# Patient Record
Sex: MALE | Race: WHITE | URBAN - METROPOLITAN AREA
[De-identification: names, ages, dates, MRNs, and addresses within clinical notes are randomized per-mention and may not be internally consistent; named-entity substitution may affect disease eponyms.]

---

## 2018-01-01 ENCOUNTER — APPOINTMENT (OUTPATIENT)
Dept: OCCUPATIONAL THERAPY | Facility: CLINIC | Age: 53
DRG: 247 | End: 2018-01-01
Attending: INTERNAL MEDICINE
Payer: COMMERCIAL

## 2018-01-01 ENCOUNTER — APPOINTMENT (OUTPATIENT)
Dept: OCCUPATIONAL THERAPY | Facility: CLINIC | Age: 53
DRG: 247 | End: 2018-01-01
Payer: COMMERCIAL

## 2018-01-01 ENCOUNTER — APPOINTMENT (OUTPATIENT)
Dept: GENERAL RADIOLOGY | Facility: CLINIC | Age: 53
DRG: 247 | End: 2018-01-01
Attending: EMERGENCY MEDICINE
Payer: COMMERCIAL

## 2018-01-01 ENCOUNTER — TELEPHONE (OUTPATIENT)
Dept: CARDIOLOGY | Facility: CLINIC | Age: 53
End: 2018-01-01

## 2018-01-01 ENCOUNTER — HOSPITAL ENCOUNTER (INPATIENT)
Facility: CLINIC | Age: 53
LOS: 3 days | Discharge: HOME | DRG: 247 | End: 2018-05-15
Attending: EMERGENCY MEDICINE | Admitting: INTERNAL MEDICINE
Payer: COMMERCIAL

## 2018-01-01 ENCOUNTER — CARE COORDINATION (OUTPATIENT)
Dept: CARDIOLOGY | Facility: CLINIC | Age: 53
End: 2018-01-01

## 2018-01-01 ENCOUNTER — TELEPHONE (OUTPATIENT)
Dept: PHARMACY | Facility: OTHER | Age: 53
End: 2018-01-01

## 2018-01-01 ENCOUNTER — APPOINTMENT (OUTPATIENT)
Dept: CARDIOLOGY | Facility: CLINIC | Age: 53
DRG: 247 | End: 2018-01-01
Attending: INTERNAL MEDICINE
Payer: COMMERCIAL

## 2018-01-01 ENCOUNTER — APPOINTMENT (OUTPATIENT)
Dept: CARDIOLOGY | Facility: CLINIC | Age: 53
DRG: 247 | End: 2018-01-01
Attending: EMERGENCY MEDICINE
Payer: COMMERCIAL

## 2018-01-01 VITALS
WEIGHT: 234 LBS | HEART RATE: 98 BPM | SYSTOLIC BLOOD PRESSURE: 94 MMHG | BODY MASS INDEX: 34.66 KG/M2 | DIASTOLIC BLOOD PRESSURE: 64 MMHG | RESPIRATION RATE: 16 BRPM | HEIGHT: 69 IN | TEMPERATURE: 98.7 F | OXYGEN SATURATION: 97 %

## 2018-01-01 DIAGNOSIS — E11.9 TYPE 2 DIABETES MELLITUS WITHOUT COMPLICATION, WITHOUT LONG-TERM CURRENT USE OF INSULIN (H): Primary | ICD-10-CM

## 2018-01-01 DIAGNOSIS — E78.5 HYPERLIPIDEMIA LDL GOAL <70: ICD-10-CM

## 2018-01-01 DIAGNOSIS — I22.0: ICD-10-CM

## 2018-01-01 LAB
ANION GAP SERPL CALCULATED.3IONS-SCNC: 11 MMOL/L (ref 3–14)
ANION GAP SERPL CALCULATED.3IONS-SCNC: 9 MMOL/L (ref 3–14)
APTT PPP: 24 SEC (ref 22–37)
BASOPHILS # BLD AUTO: 0.1 10E9/L (ref 0–0.2)
BASOPHILS NFR BLD AUTO: 0.3 %
BUN SERPL-MCNC: 12 MG/DL (ref 7–30)
BUN SERPL-MCNC: 13 MG/DL (ref 7–30)
CALCIUM SERPL-MCNC: 8.4 MG/DL (ref 8.5–10.1)
CALCIUM SERPL-MCNC: 9.3 MG/DL (ref 8.5–10.1)
CHLORIDE SERPL-SCNC: 105 MMOL/L (ref 94–109)
CHLORIDE SERPL-SCNC: 107 MMOL/L (ref 94–109)
CHOLEST SERPL-MCNC: 323 MG/DL
CO2 SERPL-SCNC: 20 MMOL/L (ref 20–32)
CO2 SERPL-SCNC: 23 MMOL/L (ref 20–32)
CREAT SERPL-MCNC: 0.93 MG/DL (ref 0.66–1.25)
CREAT SERPL-MCNC: 0.97 MG/DL (ref 0.66–1.25)
DIFFERENTIAL METHOD BLD: ABNORMAL
EOSINOPHIL # BLD AUTO: 0.5 10E9/L (ref 0–0.7)
EOSINOPHIL NFR BLD AUTO: 2.4 %
ERYTHROCYTE [DISTWIDTH] IN BLOOD BY AUTOMATED COUNT: 14.4 % (ref 10–15)
ERYTHROCYTE [DISTWIDTH] IN BLOOD BY AUTOMATED COUNT: 14.5 % (ref 10–15)
GFR SERPL CREATININE-BSD FRML MDRD: 81 ML/MIN/1.7M2
GFR SERPL CREATININE-BSD FRML MDRD: 85 ML/MIN/1.7M2
GLUCOSE BLDC GLUCOMTR-MCNC: 156 MG/DL (ref 70–99)
GLUCOSE BLDC GLUCOMTR-MCNC: 176 MG/DL (ref 70–99)
GLUCOSE BLDC GLUCOMTR-MCNC: 176 MG/DL (ref 70–99)
GLUCOSE BLDC GLUCOMTR-MCNC: 180 MG/DL (ref 70–99)
GLUCOSE BLDC GLUCOMTR-MCNC: 182 MG/DL (ref 70–99)
GLUCOSE BLDC GLUCOMTR-MCNC: 184 MG/DL (ref 70–99)
GLUCOSE BLDC GLUCOMTR-MCNC: 226 MG/DL (ref 70–99)
GLUCOSE SERPL-MCNC: 174 MG/DL (ref 70–99)
GLUCOSE SERPL-MCNC: 195 MG/DL (ref 70–99)
HBA1C MFR BLD: 7.1 % (ref 0–5.6)
HCT VFR BLD AUTO: 40.5 % (ref 40–53)
HCT VFR BLD AUTO: 44.7 % (ref 40–53)
HDLC SERPL-MCNC: 47 MG/DL
HGB BLD-MCNC: 14.4 G/DL (ref 13.3–17.7)
HGB BLD-MCNC: 16 G/DL (ref 13.3–17.7)
IMM GRANULOCYTES # BLD: 0.1 10E9/L (ref 0–0.4)
IMM GRANULOCYTES NFR BLD: 0.6 %
INR PPP: 0.95 (ref 0.86–1.14)
INTERPRETATION ECG - MUSE: NORMAL
INTERPRETATION ECG - MUSE: NORMAL
KCT BLD-ACNC: 607 SEC (ref 75–150)
LACTATE BLD-SCNC: 1.3 MMOL/L (ref 0.4–1.9)
LACTATE BLD-SCNC: 2.7 MMOL/L (ref 0.4–1.9)
LDLC SERPL CALC-MCNC: 231 MG/DL
LYMPHOCYTES # BLD AUTO: 6.7 10E9/L (ref 0.8–5.3)
LYMPHOCYTES NFR BLD AUTO: 35.4 %
MCH RBC QN AUTO: 32.2 PG (ref 26.5–33)
MCH RBC QN AUTO: 32.7 PG (ref 26.5–33)
MCHC RBC AUTO-ENTMCNC: 35.6 G/DL (ref 31.5–36.5)
MCHC RBC AUTO-ENTMCNC: 35.8 G/DL (ref 31.5–36.5)
MCV RBC AUTO: 91 FL (ref 78–100)
MCV RBC AUTO: 91 FL (ref 78–100)
MONOCYTES # BLD AUTO: 1.4 10E9/L (ref 0–1.3)
MONOCYTES NFR BLD AUTO: 7.5 %
NEUTROPHILS # BLD AUTO: 10.2 10E9/L (ref 1.6–8.3)
NEUTROPHILS NFR BLD AUTO: 53.8 %
NONHDLC SERPL-MCNC: 276 MG/DL
NRBC # BLD AUTO: 0 10*3/UL
NRBC BLD AUTO-RTO: 0 /100
PLATELET # BLD AUTO: 215 10E9/L (ref 150–450)
PLATELET # BLD AUTO: 240 10E9/L (ref 150–450)
PLATELET # BLD EST: ABNORMAL 10*3/UL
POTASSIUM SERPL-SCNC: 3.8 MMOL/L (ref 3.4–5.3)
POTASSIUM SERPL-SCNC: 3.9 MMOL/L (ref 3.4–5.3)
RBC # BLD AUTO: 4.47 10E12/L (ref 4.4–5.9)
RBC # BLD AUTO: 4.9 10E12/L (ref 4.4–5.9)
RBC MORPH BLD: NORMAL
SODIUM SERPL-SCNC: 137 MMOL/L (ref 133–144)
SODIUM SERPL-SCNC: 138 MMOL/L (ref 133–144)
TRIGL SERPL-MCNC: 225 MG/DL
TROPONIN I BLD-MCNC: 1.08 UG/L (ref 0–0.1)
TROPONIN I SERPL-MCNC: 1.54 UG/L (ref 0–0.04)
TROPONIN I SERPL-MCNC: 113.55 UG/L (ref 0–0.04)
TROPONIN I SERPL-MCNC: 148.99 UG/L (ref 0–0.04)
WBC # BLD AUTO: 18.4 10E9/L (ref 4–11)
WBC # BLD AUTO: 19 10E9/L (ref 4–11)

## 2018-01-01 PROCEDURE — 99232 SBSQ HOSP IP/OBS MODERATE 35: CPT | Performed by: INTERNAL MEDICINE

## 2018-01-01 PROCEDURE — 25000132 ZZH RX MED GY IP 250 OP 250 PS 637: Performed by: INTERNAL MEDICINE

## 2018-01-01 PROCEDURE — 99207 ZZC CDG-HISTORY COMP: MEETS EXP. PROBLEM FOCUSED - DOWN CODED LACK OF HPI: CPT | Performed by: INTERNAL MEDICINE

## 2018-01-01 PROCEDURE — 25500064 ZZH RX 255 OP 636: Performed by: INTERNAL MEDICINE

## 2018-01-01 PROCEDURE — 25000128 H RX IP 250 OP 636: Performed by: NURSE PRACTITIONER

## 2018-01-01 PROCEDURE — 83605 ASSAY OF LACTIC ACID: CPT | Performed by: INTERNAL MEDICINE

## 2018-01-01 PROCEDURE — 97110 THERAPEUTIC EXERCISES: CPT | Mod: GO | Performed by: OCCUPATIONAL THERAPIST

## 2018-01-01 PROCEDURE — 84484 ASSAY OF TROPONIN QUANT: CPT

## 2018-01-01 PROCEDURE — 99239 HOSP IP/OBS DSCHRG MGMT >30: CPT | Performed by: INTERNAL MEDICINE

## 2018-01-01 PROCEDURE — 25000128 H RX IP 250 OP 636: Performed by: EMERGENCY MEDICINE

## 2018-01-01 PROCEDURE — 80061 LIPID PANEL: CPT | Performed by: INTERNAL MEDICINE

## 2018-01-01 PROCEDURE — 71045 X-RAY EXAM CHEST 1 VIEW: CPT

## 2018-01-01 PROCEDURE — C9606 PERC D-E COR REVASC W AMI S: HCPCS | Mod: LD

## 2018-01-01 PROCEDURE — 027034Z DILATION OF CORONARY ARTERY, ONE ARTERY WITH DRUG-ELUTING INTRALUMINAL DEVICE, PERCUTANEOUS APPROACH: ICD-10-PCS | Performed by: INTERNAL MEDICINE

## 2018-01-01 PROCEDURE — 93010 ELECTROCARDIOGRAM REPORT: CPT | Performed by: INTERNAL MEDICINE

## 2018-01-01 PROCEDURE — 99152 MOD SED SAME PHYS/QHP 5/>YRS: CPT | Mod: GC | Performed by: INTERNAL MEDICINE

## 2018-01-01 PROCEDURE — 93458 L HRT ARTERY/VENTRICLE ANGIO: CPT | Mod: 26 | Performed by: INTERNAL MEDICINE

## 2018-01-01 PROCEDURE — 36415 COLL VENOUS BLD VENIPUNCTURE: CPT | Performed by: INTERNAL MEDICINE

## 2018-01-01 PROCEDURE — 25000128 H RX IP 250 OP 636: Performed by: INTERNAL MEDICINE

## 2018-01-01 PROCEDURE — 99207 ZZC CDG-MDM COMPONENT: MEETS LOW - DOWN CODED: CPT | Performed by: INTERNAL MEDICINE

## 2018-01-01 PROCEDURE — 25000132 ZZH RX MED GY IP 250 OP 250 PS 637: Performed by: EMERGENCY MEDICINE

## 2018-01-01 PROCEDURE — 84484 ASSAY OF TROPONIN QUANT: CPT | Performed by: INTERNAL MEDICINE

## 2018-01-01 PROCEDURE — 93005 ELECTROCARDIOGRAM TRACING: CPT

## 2018-01-01 PROCEDURE — 40000133 ZZH STATISTIC OT WARD VISIT: Performed by: OCCUPATIONAL THERAPIST

## 2018-01-01 PROCEDURE — 85347 COAGULATION TIME ACTIVATED: CPT

## 2018-01-01 PROCEDURE — C1887 CATHETER, GUIDING: HCPCS

## 2018-01-01 PROCEDURE — 80048 BASIC METABOLIC PNL TOTAL CA: CPT | Performed by: INTERNAL MEDICINE

## 2018-01-01 PROCEDURE — 97165 OT EVAL LOW COMPLEX 30 MIN: CPT | Mod: GO

## 2018-01-01 PROCEDURE — C1874 STENT, COATED/COV W/DEL SYS: HCPCS

## 2018-01-01 PROCEDURE — 25000125 ZZHC RX 250: Performed by: INTERNAL MEDICINE

## 2018-01-01 PROCEDURE — C1769 GUIDE WIRE: HCPCS

## 2018-01-01 PROCEDURE — 25000131 ZZH RX MED GY IP 250 OP 636 PS 637: Performed by: INTERNAL MEDICINE

## 2018-01-01 PROCEDURE — 85025 COMPLETE CBC W/AUTO DIFF WBC: CPT | Performed by: EMERGENCY MEDICINE

## 2018-01-01 PROCEDURE — 40000133 ZZH STATISTIC OT WARD VISIT

## 2018-01-01 PROCEDURE — 21000000 ZZH R&B IMCU HEART CARE

## 2018-01-01 PROCEDURE — 27210946 ZZH KIT HC TOTES DISP CR8

## 2018-01-01 PROCEDURE — 80048 BASIC METABOLIC PNL TOTAL CA: CPT | Performed by: EMERGENCY MEDICINE

## 2018-01-01 PROCEDURE — 99285 EMERGENCY DEPT VISIT HI MDM: CPT | Mod: 25

## 2018-01-01 PROCEDURE — 92941 PRQ TRLML REVSC TOT OCCL AMI: CPT | Mod: LD | Performed by: INTERNAL MEDICINE

## 2018-01-01 PROCEDURE — 99223 1ST HOSP IP/OBS HIGH 75: CPT | Mod: 25 | Performed by: INTERNAL MEDICINE

## 2018-01-01 PROCEDURE — 27210787 ZZH MANIFOLD CR2

## 2018-01-01 PROCEDURE — 93306 TTE W/DOPPLER COMPLETE: CPT | Mod: 26 | Performed by: INTERNAL MEDICINE

## 2018-01-01 PROCEDURE — 84484 ASSAY OF TROPONIN QUANT: CPT | Performed by: EMERGENCY MEDICINE

## 2018-01-01 PROCEDURE — 99406 BEHAV CHNG SMOKING 3-10 MIN: CPT | Performed by: OCCUPATIONAL THERAPIST

## 2018-01-01 PROCEDURE — B2111ZZ FLUOROSCOPY OF MULTIPLE CORONARY ARTERIES USING LOW OSMOLAR CONTRAST: ICD-10-PCS | Performed by: INTERNAL MEDICINE

## 2018-01-01 PROCEDURE — 85027 COMPLETE CBC AUTOMATED: CPT | Performed by: INTERNAL MEDICINE

## 2018-01-01 PROCEDURE — 83036 HEMOGLOBIN GLYCOSYLATED A1C: CPT | Performed by: INTERNAL MEDICINE

## 2018-01-01 PROCEDURE — 99153 MOD SED SAME PHYS/QHP EA: CPT

## 2018-01-01 PROCEDURE — 85730 THROMBOPLASTIN TIME PARTIAL: CPT | Performed by: EMERGENCY MEDICINE

## 2018-01-01 PROCEDURE — 93458 L HRT ARTERY/VENTRICLE ANGIO: CPT

## 2018-01-01 PROCEDURE — 4A023N7 MEASUREMENT OF CARDIAC SAMPLING AND PRESSURE, LEFT HEART, PERCUTANEOUS APPROACH: ICD-10-PCS | Performed by: INTERNAL MEDICINE

## 2018-01-01 PROCEDURE — 00000146 ZZHCL STATISTIC GLUCOSE BY METER IP

## 2018-01-01 PROCEDURE — C1725 CATH, TRANSLUMIN NON-LASER: HCPCS

## 2018-01-01 PROCEDURE — 99221 1ST HOSP IP/OBS SF/LOW 40: CPT | Mod: AI | Performed by: INTERNAL MEDICINE

## 2018-01-01 PROCEDURE — 99152 MOD SED SAME PHYS/QHP 5/>YRS: CPT

## 2018-01-01 PROCEDURE — 27210998 ZZH ACCESS HEART CATH CR6

## 2018-01-01 PROCEDURE — 97110 THERAPEUTIC EXERCISES: CPT | Mod: GO

## 2018-01-01 PROCEDURE — 85610 PROTHROMBIN TIME: CPT | Performed by: EMERGENCY MEDICINE

## 2018-01-01 PROCEDURE — 27210759 ZZH DEVICE INFLATION CR6

## 2018-01-01 RX ORDER — NICOTINE POLACRILEX 4 MG
15-30 LOZENGE BUCCAL
Status: DISCONTINUED | OUTPATIENT
Start: 2018-01-01 | End: 2018-01-01 | Stop reason: HOSPADM

## 2018-01-01 RX ORDER — NITROGLYCERIN 0.4 MG/1
0.4 TABLET SUBLINGUAL EVERY 5 MIN PRN
Status: DISCONTINUED | OUTPATIENT
Start: 2018-01-01 | End: 2018-01-01 | Stop reason: HOSPADM

## 2018-01-01 RX ORDER — PROTAMINE SULFATE 10 MG/ML
1-5 INJECTION, SOLUTION INTRAVENOUS
Status: DISCONTINUED | OUTPATIENT
Start: 2018-01-01 | End: 2018-01-01 | Stop reason: HOSPADM

## 2018-01-01 RX ORDER — LIDOCAINE HYDROCHLORIDE 10 MG/ML
30 INJECTION, SOLUTION EPIDURAL; INFILTRATION; INTRACAUDAL; PERINEURAL
Status: DISCONTINUED | OUTPATIENT
Start: 2018-01-01 | End: 2018-01-01 | Stop reason: HOSPADM

## 2018-01-01 RX ORDER — FLUMAZENIL 0.1 MG/ML
0.2 INJECTION, SOLUTION INTRAVENOUS
Status: DISCONTINUED | OUTPATIENT
Start: 2018-01-01 | End: 2018-01-01 | Stop reason: HOSPADM

## 2018-01-01 RX ORDER — NALOXONE HYDROCHLORIDE 0.4 MG/ML
.2-.4 INJECTION, SOLUTION INTRAMUSCULAR; INTRAVENOUS; SUBCUTANEOUS
Status: ACTIVE | OUTPATIENT
Start: 2018-01-01 | End: 2018-01-01

## 2018-01-01 RX ORDER — PRASUGREL 10 MG/1
10-60 TABLET, FILM COATED ORAL
Status: DISCONTINUED | OUTPATIENT
Start: 2018-01-01 | End: 2018-01-01 | Stop reason: HOSPADM

## 2018-01-01 RX ORDER — POTASSIUM CHLORIDE 29.8 MG/ML
20 INJECTION INTRAVENOUS
Status: DISCONTINUED | OUTPATIENT
Start: 2018-01-01 | End: 2018-01-01 | Stop reason: HOSPADM

## 2018-01-01 RX ORDER — DIPHENHYDRAMINE HYDROCHLORIDE 50 MG/ML
25-50 INJECTION INTRAMUSCULAR; INTRAVENOUS
Status: DISCONTINUED | OUTPATIENT
Start: 2018-01-01 | End: 2018-01-01 | Stop reason: HOSPADM

## 2018-01-01 RX ORDER — LIDOCAINE HYDROCHLORIDE 10 MG/ML
1-10 INJECTION, SOLUTION EPIDURAL; INFILTRATION; INTRACAUDAL; PERINEURAL
Status: COMPLETED | OUTPATIENT
Start: 2018-01-01 | End: 2018-01-01

## 2018-01-01 RX ORDER — METOPROLOL TARTRATE 1 MG/ML
5 INJECTION, SOLUTION INTRAVENOUS EVERY 5 MIN PRN
Status: DISCONTINUED | OUTPATIENT
Start: 2018-01-01 | End: 2018-01-01 | Stop reason: HOSPADM

## 2018-01-01 RX ORDER — ATROPINE SULFATE 0.1 MG/ML
.5-1 INJECTION INTRAVENOUS
Status: DISCONTINUED | OUTPATIENT
Start: 2018-01-01 | End: 2018-01-01 | Stop reason: HOSPADM

## 2018-01-01 RX ORDER — NALOXONE HYDROCHLORIDE 0.4 MG/ML
.1-.4 INJECTION, SOLUTION INTRAMUSCULAR; INTRAVENOUS; SUBCUTANEOUS
Status: DISCONTINUED | OUTPATIENT
Start: 2018-01-01 | End: 2018-01-01 | Stop reason: HOSPADM

## 2018-01-01 RX ORDER — VERAPAMIL HYDROCHLORIDE 2.5 MG/ML
1-2.5 INJECTION, SOLUTION INTRAVENOUS
Status: DISCONTINUED | OUTPATIENT
Start: 2018-01-01 | End: 2018-01-01 | Stop reason: HOSPADM

## 2018-01-01 RX ORDER — HEPARIN SODIUM 1000 [USP'U]/ML
1000-10000 INJECTION, SOLUTION INTRAVENOUS; SUBCUTANEOUS EVERY 5 MIN PRN
Status: DISCONTINUED | OUTPATIENT
Start: 2018-01-01 | End: 2018-01-01 | Stop reason: HOSPADM

## 2018-01-01 RX ORDER — GLUCOSAMINE HCL/CHONDROITIN SU 500-400 MG
CAPSULE ORAL
Qty: 100 EACH | Refills: 3 | Status: SHIPPED | OUTPATIENT
Start: 2018-01-01

## 2018-01-01 RX ORDER — HYDRALAZINE HYDROCHLORIDE 20 MG/ML
10-20 INJECTION INTRAMUSCULAR; INTRAVENOUS
Status: DISCONTINUED | OUTPATIENT
Start: 2018-01-01 | End: 2018-01-01 | Stop reason: HOSPADM

## 2018-01-01 RX ORDER — ENALAPRILAT 1.25 MG/ML
1.25-2.5 INJECTION INTRAVENOUS
Status: DISCONTINUED | OUTPATIENT
Start: 2018-01-01 | End: 2018-01-01 | Stop reason: HOSPADM

## 2018-01-01 RX ORDER — SODIUM NITROPRUSSIDE 25 MG/ML
100-200 INJECTION INTRAVENOUS
Status: DISCONTINUED | OUTPATIENT
Start: 2018-01-01 | End: 2018-01-01 | Stop reason: HOSPADM

## 2018-01-01 RX ORDER — CARVEDILOL 6.25 MG/1
6.25 TABLET ORAL 2 TIMES DAILY
Status: DISCONTINUED | OUTPATIENT
Start: 2018-01-01 | End: 2018-01-01 | Stop reason: HOSPADM

## 2018-01-01 RX ORDER — NITROGLYCERIN 20 MG/100ML
.07-1.84 INJECTION INTRAVENOUS CONTINUOUS PRN
Status: DISCONTINUED | OUTPATIENT
Start: 2018-01-01 | End: 2018-01-01 | Stop reason: HOSPADM

## 2018-01-01 RX ORDER — LISINOPRIL 2.5 MG/1
2.5 TABLET ORAL DAILY
Qty: 30 TABLET | Refills: 3 | Status: SHIPPED | OUTPATIENT
Start: 2018-01-01

## 2018-01-01 RX ORDER — ATORVASTATIN CALCIUM 40 MG/1
40 TABLET, FILM COATED ORAL DAILY
Status: DISCONTINUED | OUTPATIENT
Start: 2018-01-01 | End: 2018-01-01 | Stop reason: HOSPADM

## 2018-01-01 RX ORDER — ATROPINE SULFATE 0.1 MG/ML
0.5 INJECTION INTRAVENOUS EVERY 5 MIN PRN
Status: DISPENSED | OUTPATIENT
Start: 2018-01-01 | End: 2018-01-01

## 2018-01-01 RX ORDER — NITROGLYCERIN 0.4 MG/1
0.4 TABLET SUBLINGUAL EVERY 5 MIN PRN
Status: DISCONTINUED | OUTPATIENT
Start: 2018-01-01 | End: 2018-01-01

## 2018-01-01 RX ORDER — FLUMAZENIL 0.1 MG/ML
0.2 INJECTION, SOLUTION INTRAVENOUS
Status: ACTIVE | OUTPATIENT
Start: 2018-01-01 | End: 2018-01-01

## 2018-01-01 RX ORDER — NITROGLYCERIN 5 MG/ML
100-500 VIAL (ML) INTRAVENOUS
Status: DISCONTINUED | OUTPATIENT
Start: 2018-01-01 | End: 2018-01-01 | Stop reason: HOSPADM

## 2018-01-01 RX ORDER — DEXTROSE MONOHYDRATE 25 G/50ML
12.5-5 INJECTION, SOLUTION INTRAVENOUS EVERY 30 MIN PRN
Status: DISCONTINUED | OUTPATIENT
Start: 2018-01-01 | End: 2018-01-01 | Stop reason: HOSPADM

## 2018-01-01 RX ORDER — ASPIRIN 81 MG/1
81-324 TABLET, CHEWABLE ORAL
Status: DISCONTINUED | OUTPATIENT
Start: 2018-01-01 | End: 2018-01-01 | Stop reason: HOSPADM

## 2018-01-01 RX ORDER — ONDANSETRON 2 MG/ML
4 INJECTION INTRAMUSCULAR; INTRAVENOUS EVERY 4 HOURS PRN
Status: DISCONTINUED | OUTPATIENT
Start: 2018-01-01 | End: 2018-01-01 | Stop reason: HOSPADM

## 2018-01-01 RX ORDER — POTASSIUM CHLORIDE 7.45 MG/ML
10 INJECTION INTRAVENOUS
Status: DISCONTINUED | OUTPATIENT
Start: 2018-01-01 | End: 2018-01-01 | Stop reason: HOSPADM

## 2018-01-01 RX ORDER — FUROSEMIDE 10 MG/ML
20-100 INJECTION INTRAMUSCULAR; INTRAVENOUS
Status: COMPLETED | OUTPATIENT
Start: 2018-01-01 | End: 2018-01-01

## 2018-01-01 RX ORDER — ACETAMINOPHEN 325 MG/1
325-650 TABLET ORAL EVERY 4 HOURS PRN
Status: DISCONTINUED | OUTPATIENT
Start: 2018-01-01 | End: 2018-01-01 | Stop reason: HOSPADM

## 2018-01-01 RX ORDER — NITROGLYCERIN 5 MG/ML
100-200 VIAL (ML) INTRAVENOUS
Status: DISCONTINUED | OUTPATIENT
Start: 2018-01-01 | End: 2018-01-01 | Stop reason: HOSPADM

## 2018-01-01 RX ORDER — FUROSEMIDE 10 MG/ML
20 INJECTION INTRAMUSCULAR; INTRAVENOUS DAILY
Status: DISCONTINUED | OUTPATIENT
Start: 2018-01-01 | End: 2018-01-01

## 2018-01-01 RX ORDER — LORAZEPAM 2 MG/ML
.5-2 INJECTION INTRAMUSCULAR EVERY 4 HOURS PRN
Status: DISCONTINUED | OUTPATIENT
Start: 2018-01-01 | End: 2018-01-01 | Stop reason: HOSPADM

## 2018-01-01 RX ORDER — PHENYLEPHRINE HCL IN 0.9% NACL 1 MG/10 ML
20-100 SYRINGE (ML) INTRAVENOUS
Status: DISCONTINUED | OUTPATIENT
Start: 2018-01-01 | End: 2018-01-01 | Stop reason: HOSPADM

## 2018-01-01 RX ORDER — ASPIRIN 81 MG/1
81 TABLET ORAL DAILY
Status: DISCONTINUED | OUTPATIENT
Start: 2018-01-01 | End: 2018-01-01 | Stop reason: HOSPADM

## 2018-01-01 RX ORDER — PROMETHAZINE HYDROCHLORIDE 25 MG/ML
6.25-25 INJECTION, SOLUTION INTRAMUSCULAR; INTRAVENOUS EVERY 4 HOURS PRN
Status: DISCONTINUED | OUTPATIENT
Start: 2018-01-01 | End: 2018-01-01 | Stop reason: HOSPADM

## 2018-01-01 RX ORDER — LISINOPRIL 2.5 MG/1
2.5 TABLET ORAL AT BEDTIME
Status: DISCONTINUED | OUTPATIENT
Start: 2018-01-01 | End: 2018-01-01 | Stop reason: HOSPADM

## 2018-01-01 RX ORDER — FENTANYL CITRATE 50 UG/ML
25-50 INJECTION, SOLUTION INTRAMUSCULAR; INTRAVENOUS
Status: DISCONTINUED | OUTPATIENT
Start: 2018-01-01 | End: 2018-01-01 | Stop reason: HOSPADM

## 2018-01-01 RX ORDER — CARVEDILOL 6.25 MG/1
6.25 TABLET ORAL 2 TIMES DAILY
Qty: 60 TABLET | Refills: 3 | Status: SHIPPED | OUTPATIENT
Start: 2018-01-01

## 2018-01-01 RX ORDER — MORPHINE SULFATE 2 MG/ML
1-2 INJECTION, SOLUTION INTRAMUSCULAR; INTRAVENOUS EVERY 5 MIN PRN
Status: DISCONTINUED | OUTPATIENT
Start: 2018-01-01 | End: 2018-01-01 | Stop reason: HOSPADM

## 2018-01-01 RX ORDER — BUPIVACAINE HYDROCHLORIDE 2.5 MG/ML
1-10 INJECTION, SOLUTION EPIDURAL; INFILTRATION; INTRACAUDAL
Status: DISCONTINUED | OUTPATIENT
Start: 2018-01-01 | End: 2018-01-01 | Stop reason: HOSPADM

## 2018-01-01 RX ORDER — PROTAMINE SULFATE 10 MG/ML
25-100 INJECTION, SOLUTION INTRAVENOUS EVERY 5 MIN PRN
Status: DISCONTINUED | OUTPATIENT
Start: 2018-01-01 | End: 2018-01-01 | Stop reason: HOSPADM

## 2018-01-01 RX ORDER — NITROGLYCERIN 0.4 MG/1
TABLET SUBLINGUAL
Qty: 25 TABLET | Refills: 3 | Status: SHIPPED | OUTPATIENT
Start: 2018-01-01

## 2018-01-01 RX ORDER — CLOPIDOGREL 300 MG/1
300-600 TABLET, FILM COATED ORAL
Status: DISCONTINUED | OUTPATIENT
Start: 2018-01-01 | End: 2018-01-01 | Stop reason: HOSPADM

## 2018-01-01 RX ORDER — DEXTROSE MONOHYDRATE 25 G/50ML
25-50 INJECTION, SOLUTION INTRAVENOUS
Status: DISCONTINUED | OUTPATIENT
Start: 2018-01-01 | End: 2018-01-01 | Stop reason: HOSPADM

## 2018-01-01 RX ORDER — NIFEDIPINE 10 MG/1
10 CAPSULE ORAL
Status: DISCONTINUED | OUTPATIENT
Start: 2018-01-01 | End: 2018-01-01 | Stop reason: HOSPADM

## 2018-01-01 RX ORDER — CLOPIDOGREL BISULFATE 75 MG/1
75 TABLET ORAL
Status: DISCONTINUED | OUTPATIENT
Start: 2018-01-01 | End: 2018-01-01 | Stop reason: HOSPADM

## 2018-01-01 RX ORDER — ATORVASTATIN CALCIUM 40 MG/1
40 TABLET, FILM COATED ORAL DAILY
Qty: 30 TABLET | Refills: 3 | Status: SHIPPED | OUTPATIENT
Start: 2018-01-01

## 2018-01-01 RX ORDER — METHYLPREDNISOLONE SODIUM SUCCINATE 125 MG/2ML
125 INJECTION, POWDER, LYOPHILIZED, FOR SOLUTION INTRAMUSCULAR; INTRAVENOUS
Status: DISCONTINUED | OUTPATIENT
Start: 2018-01-01 | End: 2018-01-01 | Stop reason: HOSPADM

## 2018-01-01 RX ORDER — DOPAMINE HYDROCHLORIDE 160 MG/100ML
2-20 INJECTION, SOLUTION INTRAVENOUS CONTINUOUS PRN
Status: DISCONTINUED | OUTPATIENT
Start: 2018-01-01 | End: 2018-01-01 | Stop reason: HOSPADM

## 2018-01-01 RX ORDER — DOBUTAMINE HYDROCHLORIDE 200 MG/100ML
2-20 INJECTION INTRAVENOUS CONTINUOUS PRN
Status: DISCONTINUED | OUTPATIENT
Start: 2018-01-01 | End: 2018-01-01 | Stop reason: HOSPADM

## 2018-01-01 RX ORDER — HYDROCODONE BITARTRATE AND ACETAMINOPHEN 5; 325 MG/1; MG/1
1-2 TABLET ORAL EVERY 4 HOURS PRN
Status: DISCONTINUED | OUTPATIENT
Start: 2018-01-01 | End: 2018-01-01 | Stop reason: HOSPADM

## 2018-01-01 RX ORDER — IOPAMIDOL 755 MG/ML
160 INJECTION, SOLUTION INTRAVASCULAR ONCE
Status: DISCONTINUED | OUTPATIENT
Start: 2018-01-01 | End: 2018-01-01

## 2018-01-01 RX ORDER — CARVEDILOL 3.12 MG/1
3.12 TABLET ORAL 2 TIMES DAILY
Status: DISCONTINUED | OUTPATIENT
Start: 2018-01-01 | End: 2018-01-01 | Stop reason: DRUGHIGH

## 2018-01-01 RX ORDER — EPINEPHRINE 1 MG/ML
0.3 INJECTION, SOLUTION, CONCENTRATE INTRAVENOUS
Status: DISCONTINUED | OUTPATIENT
Start: 2018-01-01 | End: 2018-01-01 | Stop reason: HOSPADM

## 2018-01-01 RX ORDER — ASPIRIN 325 MG
325 TABLET ORAL
Status: DISCONTINUED | OUTPATIENT
Start: 2018-01-01 | End: 2018-01-01 | Stop reason: HOSPADM

## 2018-01-01 RX ORDER — FENTANYL CITRATE 50 UG/ML
25-50 INJECTION, SOLUTION INTRAMUSCULAR; INTRAVENOUS
Status: DISPENSED | OUTPATIENT
Start: 2018-01-01 | End: 2018-01-01

## 2018-01-01 RX ORDER — ADENOSINE 3 MG/ML
12-12000 INJECTION, SOLUTION INTRAVENOUS
Status: DISCONTINUED | OUTPATIENT
Start: 2018-01-01 | End: 2018-01-01 | Stop reason: HOSPADM

## 2018-01-01 RX ORDER — LANCETS
EACH MISCELLANEOUS
Qty: 100 EACH | Refills: 3 | Status: SHIPPED | OUTPATIENT
Start: 2018-01-01

## 2018-01-01 RX ORDER — NALOXONE HYDROCHLORIDE 0.4 MG/ML
0.4 INJECTION, SOLUTION INTRAMUSCULAR; INTRAVENOUS; SUBCUTANEOUS EVERY 5 MIN PRN
Status: DISCONTINUED | OUTPATIENT
Start: 2018-01-01 | End: 2018-01-01 | Stop reason: HOSPADM

## 2018-01-01 RX ORDER — ASPIRIN 81 MG/1
324 TABLET, CHEWABLE ORAL ONCE
Status: COMPLETED | OUTPATIENT
Start: 2018-01-01 | End: 2018-01-01

## 2018-01-01 RX ORDER — LISINOPRIL 10 MG/1
10 TABLET ORAL DAILY
Status: DISCONTINUED | OUTPATIENT
Start: 2018-01-01 | End: 2018-01-01

## 2018-01-01 RX ORDER — NICARDIPINE HYDROCHLORIDE 2.5 MG/ML
100 INJECTION INTRAVENOUS
Status: DISCONTINUED | OUTPATIENT
Start: 2018-01-01 | End: 2018-01-01 | Stop reason: HOSPADM

## 2018-01-01 RX ADMIN — ASPIRIN 81 MG: 81 TABLET, COATED ORAL at 09:05

## 2018-01-01 RX ADMIN — BIVALIRUDIN 1.75 MG/KG/HR: 250 INJECTION, POWDER, LYOPHILIZED, FOR SOLUTION INTRAVENOUS at 18:16

## 2018-01-01 RX ADMIN — CARVEDILOL 6.25 MG: 6.25 TABLET, FILM COATED ORAL at 20:33

## 2018-01-01 RX ADMIN — INSULIN ASPART 2 UNITS: 100 INJECTION, SOLUTION INTRAVENOUS; SUBCUTANEOUS at 19:58

## 2018-01-01 RX ADMIN — ATORVASTATIN CALCIUM 40 MG: 40 TABLET, FILM COATED ORAL at 08:55

## 2018-01-01 RX ADMIN — ACETAMINOPHEN 325 MG: 325 TABLET ORAL at 01:04

## 2018-01-01 RX ADMIN — Medication 81.7 MG: at 18:12

## 2018-01-01 RX ADMIN — SODIUM CHLORIDE 1000 ML: 9 INJECTION, SOLUTION INTRAVENOUS at 00:00

## 2018-01-01 RX ADMIN — LISINOPRIL 2.5 MG: 2.5 TABLET ORAL at 21:04

## 2018-01-01 RX ADMIN — CARVEDILOL 6.25 MG: 6.25 TABLET, FILM COATED ORAL at 08:55

## 2018-01-01 RX ADMIN — NITROGLYCERIN 0.4 MG: 0.4 TABLET SUBLINGUAL at 17:54

## 2018-01-01 RX ADMIN — CARVEDILOL 3.12 MG: 3.12 TABLET, FILM COATED ORAL at 20:31

## 2018-01-01 RX ADMIN — ACETAMINOPHEN 325 MG: 325 TABLET ORAL at 04:57

## 2018-01-01 RX ADMIN — FENTANYL CITRATE 200 MCG: 50 INJECTION, SOLUTION INTRAMUSCULAR; INTRAVENOUS at 18:41

## 2018-01-01 RX ADMIN — LISINOPRIL 10 MG: 10 TABLET ORAL at 20:31

## 2018-01-01 RX ADMIN — TICAGRELOR 90 MG: 90 TABLET ORAL at 20:31

## 2018-01-01 RX ADMIN — NITROGLYCERIN 0.4 MG: 0.4 TABLET SUBLINGUAL at 21:46

## 2018-01-01 RX ADMIN — TICAGRELOR 90 MG: 90 TABLET ORAL at 08:54

## 2018-01-01 RX ADMIN — TICAGRELOR 90 MG: 90 TABLET ORAL at 08:09

## 2018-01-01 RX ADMIN — FENTANYL CITRATE 12.5 MCG: 50 INJECTION INTRAMUSCULAR; INTRAVENOUS at 00:16

## 2018-01-01 RX ADMIN — MIDAZOLAM 2 MG: 1 INJECTION INTRAMUSCULAR; INTRAVENOUS at 18:41

## 2018-01-01 RX ADMIN — HEPARIN SODIUM 7500 UNITS: 1000 INJECTION, SOLUTION INTRAVENOUS; SUBCUTANEOUS at 17:54

## 2018-01-01 RX ADMIN — ASPIRIN 81 MG: 81 TABLET, COATED ORAL at 08:55

## 2018-01-01 RX ADMIN — INSULIN ASPART 1 UNITS: 100 INJECTION, SOLUTION INTRAVENOUS; SUBCUTANEOUS at 18:21

## 2018-01-01 RX ADMIN — TICAGRELOR 90 MG: 90 TABLET ORAL at 21:03

## 2018-01-01 RX ADMIN — ATORVASTATIN CALCIUM 40 MG: 40 TABLET, FILM COATED ORAL at 20:31

## 2018-01-01 RX ADMIN — FUROSEMIDE 20 MG: 10 INJECTION, SOLUTION INTRAVENOUS at 18:44

## 2018-01-01 RX ADMIN — NITROGLYCERIN 300 MCG: 5 INJECTION, SOLUTION INTRAVENOUS at 18:27

## 2018-01-01 RX ADMIN — TICAGRELOR 90 MG: 90 TABLET ORAL at 09:05

## 2018-01-01 RX ADMIN — ASPIRIN 81 MG: 81 TABLET, COATED ORAL at 08:09

## 2018-01-01 RX ADMIN — HYDROCODONE BITARTRATE AND ACETAMINOPHEN 1 TABLET: 5; 325 TABLET ORAL at 01:18

## 2018-01-01 RX ADMIN — ATORVASTATIN CALCIUM 40 MG: 40 TABLET, FILM COATED ORAL at 09:05

## 2018-01-01 RX ADMIN — TICAGRELOR 90 MG: 90 TABLET ORAL at 20:33

## 2018-01-01 RX ADMIN — HYDROCODONE BITARTRATE AND ACETAMINOPHEN 1 TABLET: 5; 325 TABLET ORAL at 01:04

## 2018-01-01 RX ADMIN — CARVEDILOL 6.25 MG: 6.25 TABLET, FILM COATED ORAL at 09:05

## 2018-01-01 RX ADMIN — TICAGRELOR 180 MG: 90 TABLET ORAL at 17:54

## 2018-01-01 RX ADMIN — HUMAN ALBUMIN MICROSPHERES AND PERFLUTREN 3 ML: 10; .22 INJECTION, SOLUTION INTRAVENOUS at 14:25

## 2018-01-01 RX ADMIN — LIDOCAINE HYDROCHLORIDE 8 ML: 10 INJECTION, SOLUTION EPIDURAL; INFILTRATION; INTRACAUDAL; PERINEURAL at 18:17

## 2018-01-01 RX ADMIN — HYDROCODONE BITARTRATE AND ACETAMINOPHEN 1 TABLET: 5; 325 TABLET ORAL at 21:05

## 2018-01-01 RX ADMIN — INSULIN ASPART 2 UNITS: 100 INJECTION, SOLUTION INTRAVENOUS; SUBCUTANEOUS at 14:11

## 2018-01-01 RX ADMIN — CARVEDILOL 6.25 MG: 6.25 TABLET, FILM COATED ORAL at 10:54

## 2018-01-01 RX ADMIN — INSULIN ASPART 2 UNITS: 100 INJECTION, SOLUTION INTRAVENOUS; SUBCUTANEOUS at 10:00

## 2018-01-01 RX ADMIN — ADENOSINE 120 MCG: 3 INJECTION, SOLUTION INTRAVENOUS at 18:31

## 2018-01-01 RX ADMIN — INSULIN ASPART 1 UNITS: 100 INJECTION, SOLUTION INTRAVENOUS; SUBCUTANEOUS at 08:53

## 2018-01-01 RX ADMIN — CARVEDILOL 6.25 MG: 6.25 TABLET, FILM COATED ORAL at 21:04

## 2018-01-01 RX ADMIN — ATORVASTATIN CALCIUM 40 MG: 40 TABLET, FILM COATED ORAL at 08:09

## 2018-01-01 RX ADMIN — ASPIRIN 81 MG 324 MG: 81 TABLET ORAL at 17:53

## 2018-01-01 RX ADMIN — HYDROCODONE BITARTRATE AND ACETAMINOPHEN 1 TABLET: 5; 325 TABLET ORAL at 04:57

## 2018-01-01 RX ADMIN — EPINEPHRINE 2 MCG: 0.1 INJECTION INTRACARDIAC; INTRAVENOUS at 00:23

## 2018-01-01 RX ADMIN — SODIUM CHLORIDE 300 ML: 9 INJECTION, SOLUTION INTRAVENOUS at 22:09

## 2018-01-01 ASSESSMENT — PAIN DESCRIPTION - DESCRIPTORS
DESCRIPTORS: ACHING;DISCOMFORT
DESCRIPTORS: SORE
DESCRIPTORS: ACHING;DISCOMFORT
DESCRIPTORS: DISCOMFORT
DESCRIPTORS: SORE
DESCRIPTORS: ACHING
DESCRIPTORS: SORE

## 2018-01-01 ASSESSMENT — ENCOUNTER SYMPTOMS
CHEST TIGHTNESS: 1
SHORTNESS OF BREATH: 1
FATIGUE: 1
NAUSEA: 0
DIAPHORESIS: 0

## 2018-05-12 PROBLEM — I21.3 STEMI (ST ELEVATION MYOCARDIAL INFARCTION) (H): Status: ACTIVE | Noted: 2018-01-01

## 2018-05-12 NOTE — H&P
"History and Physical  Dutch Donnelly    Age: 53 year old    YOB: 1965  MRN# [id number]    Primary care provider: No primary care provider on file.  Date of Admission:  5/12/2018    CHIEF COMPLAINT: STEMI    HISTORY OF PRESENT ILLNESS  Dutch Donnelly is a 53 year old male with no significant post medical history who presents with chest pain to ER. He had intermittent left chest pain described as \"tightness\" that radiates into and shoots down his left arm for the last ~2 days that has worsened and became consistent at 1600 today. He endorses fatigue today and also slept most of the day. Patient administered two ibuprofen two hours ago and believes that this somewhat alleviated his pain. He also had some shortness of breath. Patient denies diaphoresis, nausea, history of diabetes or hypertension, allergies to any medication, or other complaint.   In the ER, EKG showed ST elevation in the anterior leads. He was given heparin bolus and also brillanta.    ======================================================  ALLERGIES   No Known Allergies    MEDICATIONS  No prescriptions prior to admission.        PAST MEDICAL HISTORY  History reviewed. No pertinent past medical history.    PAST SURGICAL HISTORY  History reviewed. No pertinent surgical history.     SOCIAL HISTORY  No documented history of smoking  FAMILY HISTORY  No know family history of premature CAD  ===================================================  REVIEW OF SYSTEMS  Skin: negative  Eyes: negative  Ears/Nose/Throat: negative  Respiratory: as above  Cardiovascular: as above  Gastrointestinal: negative  Genitourinary: negative  Musculoskeletal: negative  Neurologic: negative  Psychiatric: negative  Hematologic/Lymphatic/Immunologic: negative  Endocrine: negative  ===================================================  PHYSICAL EXAM  BP (!) 147/99  Temp 97.3  F (36.3  C) (Oral)  Ht 1.753 m (5' 9\")  Wt 108.9 kg (240 lb)  SpO2 99%  BMI 35.44 kg/m2  240 " lbs 0 oz  Body mass index is 35.44 kg/(m^2).    Intake/Output Summary (Last 24 hours) at 05/12/18 1936  Last data filed at 05/12/18 1915   Gross per 24 hour   Intake                0 ml   Output              650 ml   Net             -650 ml     General: well-appearing, no acute distress  HEENT: NC/AT, oropharynx clear  Neck: supple, no LAD  Chest: CTA b/l  Heart: RRR, no m/r/g  Vascular: femoral and DP/PT pulses intact b/l  Abdomen: soft, NT/ND  Extremities: warm and well-perfused w/o cyanosis, clubbing or edema  Neuro: A&Ox3, non-focal  =====================================================  LABORATORY DATA  CMP  Recent Labs  Lab 05/12/18  1740      POTASSIUM 3.8   CHLORIDE 105   CO2 23   ANIONGAP 9   *   BUN 13   CR 0.97   GFRESTIMATED 81   GFRESTBLACK >90   CAMILO 9.3     CBC  Recent Labs  Lab 05/12/18  1740   WBC 19.0*   RBC 4.90   HGB 16.0   HCT 44.7   MCV 91   MCH 32.7   MCHC 35.8   RDW 14.5        INR  Recent Labs  Lab 05/12/18  1740   INR 0.95      Angiogram:    SUMMARY:   Single vessel obstructive CAD - LAD   -  40% distal left main stenosis  -Subtotal 99% occlusion of the mid LAD  -70% stenosis of the ostium of D1( However, it was a short vessel)  -30% stenosis of the proximal OM2    Successful deployment of a drug eluting stent    -3.0 x 24 mm Synergy drug eluting stent across the mid LAD with post-dilation with a 3.5 x 12 mm non-compliant balloon                                                  Elevated Left ventricular end diastolic pressure at 35 mm Hg  ==================================================  ASSESSMENT AND PLAN  Dutch Donnelly is a 53 year old with STEMI and occlusion of mid LAD    Plan:   -s/p angiogram with stenting to mid LAD with 3.0x 24 mm Synergy KATHY stent  -to start aspirin and ticagrelor  -to get baseline echo to access wall motion  -to give lasix for elevated left ventricular end diastolic pressure  -risk factor modifications and cardiac rehab referral.    Patient  seen and discussed with [place] Mckinney .    Hola Riojas MD  Cardiology Fellow      May 12, 2018

## 2018-05-12 NOTE — IP AVS SNAPSHOT
MRN:[id number]                      After Visit Summary   5/12/2018    Dutch Donnelly    MRN: [id number]           Thank you!     Thank you for choosing [facility] for your care. Our goal is always to provide you with excellent care. Hearing back from our patients is one way we can continue to improve our services. Please take a few minutes to complete the written survey that you may receive in the mail after you visit with us. Thank you!        Patient Information     Date Of Birth          1965        Designated Caregiver       Most Recent Value    Caregiver    Will someone help with your care after discharge? no      About your hospital stay     You were admitted on:  May 12, 2018 You last received care in the:  [facility] Coronary Care Unit    You were discharged on:  May 15, 2018        Reason for your hospital stay       Heart attack, and new onset diabetes                  Who to Call     For medical emergencies, please call 911.  For non-urgent questions about your medical care, please call your primary care provider or clinic, None          Attending Provider     Provider Specialty    Nany Li MD Emergency Medicine    [facility], [place] Montalvo MD Cardiology       Primary Care Provider Fax #    Physician No Ref-Primary [phone]      After Care Instructions     Activity       Your activity upon discharge: avoid strenuous activity for 2 weeks.            Diet       Follow this diet upon discharge: Orders Placed This Encounter      Combination Diet Low Saturated Fat Na <2400mg Diet      Diabetic diet (avoid concentrated sweets).            Discharge Instructions           Monitor and record       blood glucose 4 times a day, before meals and at bedtime -- bring results in with doctor appointment                  Follow-up Appointments     Follow-up and recommended labs and tests        Follow up with primary care provider at [facility] in 3 to 6 days, review  blood sugars then.                  Your next 10 appointments already scheduled     May 18, 2018  2:20 PM CDT   [facility]    [address] [id number] [phone]           Bring a current list of meds and any records pertaining to this visit. For Physicals, please bring immunization records and any forms needing to be filled out. Please arrive 10 minutes early to complete paperwork.            May 22, 2018 10:00 AM CDT   Cardiac Evaluation with  CARDIAC REHAB 4   [facility] ([facility])    [address]  [facility] [id number] [phone]            May 25, 2018 11:20 AM CDT   LAB with DEVLIN LAB   [facility] ([facility])    [id number]  [facility] [address] [phone]           Please do not eat 10-12 hours before your appointment if you are coming in fasting for labs on lipids, cholesterol, or glucose (sugar). This does not apply to pregnant women. Water, hot tea and black coffee (with nothing added) are okay. Do not drink other fluids, diet soda or chew gum.            May 25, 2018 12:30 PM CDT   Return Discharge with [facility] ([facility])    [id number]  [facility] [address] [phone] OPT 2            Aug 23, 2018  8:20 AM CDT   LAB with DEVLIN LAB   [facility] ([facility])    [id number]  [facility] [address] [phone]           Please do not eat 10-12 hours before your appointment if you are coming in fasting for labs on lipids, cholesterol, or glucose (sugar). This does not apply to pregnant women. Water, hot tea and black coffee (with nothing added) are okay. Do not drink other fluids, diet soda or  chew gum.            Aug 23, 2018  8:45 AM CDT   Ech Complete with [id number]   [facility] CV Echocardiography ([facility] at [facility])    [address]  [facility] [id number] [phone]           1. Please bring or wear a comfortable two-piece outfit. 2. You may eat, drink and take your normal medicines. 3. For any questions that cannot be answered, please contact the ordering physician            Aug 30, 2018 10:15 AM CDT   Return Visit with [place] Mckinney MD   [facility] ([facility])    [address]  [facility] [id number] [phone] OPT 2              Additional Services     CARDIAC REHAB REFERRAL       Patient may choose their preference of the site for Cardiac Rehab.            Diabetes Educator Referral       DIABETES SELF MANAGEMENT TRAINING (DSMT)      Your provider has referred you to Diabetes Education: FMG: Diabetes Education - All [facility] [phone]   [email or web address]     If an urgent visit is needed or A1C is above 12, Care Team to call the Diabetes  Education Team at [phone] or send an In Basket message to the Diabetes Education Pool (P DIAB ED-PATIENT CARE).    A  will call you to make your appointment. If it has been more than 3 business days since your referral was placed, please call the above phone number to schedule.    Type of training and number of hours: New Diagnosis: Initial group DSMT - 10 hours.       Diabetes Education Topics: Comprehensive Knowledge Assessment and Instruction    Special Educational Needs Requiring Individual DSMT: None      Please be aware that coverage of these services is subject to the terms and limitations of your health insurance plan.  Call member services at your health plan to determine Diabetes Self-Management Training (Codes  and ) and Medical  Nutrition Therapy (Codes 34444 and 08454) benefits and ask which blood glucose monitor brands are covered by your plan.  Please bring the following with you to your appointment:    (1)  List of current medications   (2)  List of Blood Glucose Monitor brands that are covered by your insurance plan  (3)  Blood Glucose Monitor and log book  (4)   Food records for the 3 days prior to your visit    The Certified Diabetes Educator may make diabetes medication adjustments per the CDE Protocol and Collaborative Practice Agreement.            Med Therapy Manage Referral       Your provider has referred you to: **[facility] Scheduling (numerous locations) [phone]   [email or web address]    Reason for Referral: Newly Diagnosed Type 2 Diabetes    The [facility] department will contact you to schedule an appointment.  You may also schedule the appointment by calling [phone].  For [facility] [place] patients, please call [phone] to confirm/schedule your appointment on the next business day.    This service is designed to help you get the most from your medications.  A specially trained Pharmacist will work closely with you and your providers to solve any questions, concerns, issues or problems related to your medications.    Please bring all of your prescription and non-prescription medications (such as vitamins, over-the-counter medications, and herbals) or a detailed medication list to your appointment.    If you have a glucose meter or other home monitoring information, please also bring this to your appointment (i.e. blood glucose log, blood pressure log, pain log, etc.).            Follow-Up with Cardiac Advanced Practice Provider           Follow-Up with Cardiologist                 Future tests that were ordered for you     Echocardiogram       Administration of IV contrast will be tailored to this  examination per the appropriate written protocol listed in the Echocardiography department Protocol Book, or by the supervising Cardiologist. This may result in an order change.    Use of contrast is at the discretion of the supervising Cardiologist.            Basic metabolic panel           ALT       Last Lab Result: No results found for: ALT            Lipid Profile                 Further instructions from your care team       Cardiac Angioplasty/Stent Discharge Instructions - Femoral    After you go home:      Have an adult stay with you until tomorrow.    Drink extra fluids for 2 days.    You may resume your normal diet.    No smoking       For 24 hours - due to the sedation you received:    Relax and take it easy.    Do NOT make any important or legal decisions.    Do NOT drive or operate machines at home or at work.    Do NOT drink alcohol.    Care of Groin Puncture Site:      For the first 24 hrs - check the puncture site every 1-2 hours while awake.    For 2 days, when you cough, sneeze, laugh or move your bowels, hold your hand over the puncture site and press firmly.    Remove the bandaid after 24 hours. If there is minor oozing, apply another bandaid and remove it after 12 hours.    It is normal to have a small bruise or pea size lump at the site.    You may shower tomorrow. Do NOT take a bath, or use a hot tub or pool for at least 3 days. Do NOT scrub the site. Do not use lotion or powder near the puncture site.     Activity:            For 2 days:    No stooping or squatting    Do NOT do any heavy activity such as exercise, lifting, or straining.     No housework, yard work or any activity that make you sweat    Do NOT lift more than 10 pounds    Bleeding:      If you start bleeding from the site in your groin, lie down flat and press firmly on/above the site for 10 minutes.     Once bleeding stops, lay flat for 2 hours.     Call [facility] as soon as you can.       Call 911 right away if you have  heavy bleeding or bleeding that does not stop.      Medicines:      If you are taking antiplatelet medications such as Plavix, Brilinta or Effient, do not stop taking it until you talk to your cardiologist.        If you are on Metformin (Glucophage), do not restart it until you have blood tests (within 2 to 3 days after discharge).  After you have your blood drawn, you may restart the Metformin.     Take your medications, including blood thinners, unless your provider tells you not to.  If you take Coumadin (Warfarin), have your INR checked by your provider in  3-5 days. Call your clinic to schedule this.    If you have stopped any medicines, check with your provider about when to restart them.    Follow Up Appointments:      Follow up with [facility] Nurse Practitioner at [facility] of patient preference in 7-10 days.    Cardiac Rehab will contact you for follow up care.    Call the clinic if:      You have increased pain or a large or growing hard lump around the site.    The site is red, swollen, hot or tender.    Blood or fluid is draining from the site.    You have chills or a fever greater than 101 F (38 C).    Your leg feels numb, cool or changes color.    You have hives, a rash or unusual itching.    New pain in the back or belly that you cannot control with Tylenol.    Any questions or concerns.      Other Instructions:      If you received a stent - carry your stent card with you at all times.      [facility] [place]:    [phone] [facility] (7 days a week)        Pending Results     No orders found from 5/10/2018 to 5/13/2018.            Statement of Approval     Ordered          05/15/18 1152  I have reviewed and agree with all the recommendations and orders detailed in this document.  EFFECTIVE NOW     Approved and electronically signed by:  Benjy Lacy MD             Admission Information     Date & Time Provider Department Dept. Phone    5/12/2018 Hank  "[place] Montalvo MD [facility] Coronary Care Unit [phone]      Your Vitals Were     Blood Pressure Pulse Temperature Respirations Height Weight    101/66 98 98.7  F (37.1  C) (Oral) 16 1.753 m (5' 9\") 106.1 kg (234 lb)    Pulse Oximetry BMI (Body Mass Index)                99% 34.56 kg/m2          [employer] Information     [employer] lets you send messages to your doctor, view your test results, renew your prescriptions, schedule appointments and more. To sign up, go to [email or web address] . Click on \"Log in\" on the left side of the screen, which will take you to the Welcome page. Then click on \"Sign up Now\" on the right side of the page.     You will be asked to enter the access code listed below, as well as some personal information. Please follow the directions to create your username and password.     Your access code is: [email or web address]  Expires: 2018 12:19 PM     Your access code will  in 90 days. If you need help or a new code, please call your [facility] clinic or [phone].        Care EveryWhere ID     This is your Care EveryWhere ID. This could be used by other organizations to access your [facility] medical records  [id number]        Equal Access to Services     [place]INS : Leslie saez Sonolberto, waaxda luqadaha, qaybta kaalmada adealex, austen balderrama. So [facility] [phone].    ATENCIÓN: Si habla español, tiene a del angel disposición servicios gratuitos de asistencia lingüística. Llame al [phone].    We comply with applicable federal civil rights laws and [place] laws. We do not discriminate on the basis of race, color, national origin, age, disability, sex, sexual orientation, or gender identity.               Review of your medicines      START taking        Dose / Directions    alcohol swab prep pads   Used for:  Type 2 diabetes mellitus without complication, without long-term current use of insulin (H)        Use to swab area of injection/tsering " as directed.   Quantity:  100 each   Refills:  3       aspirin 81 MG EC tablet   Used for:  Type 2 diabetes mellitus without complication, without long-term current use of insulin (H)        Dose:  81 mg   Start taking on:  5/16/2018   Take 1 tablet (81 mg) by mouth daily   Quantity:  100 tablet   Refills:  3       atorvastatin 40 MG tablet   Commonly known as:  LIPITOR        Dose:  40 mg   Start taking on:  5/16/2018   Take 1 tablet (40 mg) by mouth daily   Quantity:  30 tablet   Refills:  3       blood glucose calibration solution   Commonly known as:  NO BRAND SPECIFIED   Used for:  Type 2 diabetes mellitus without complication, without long-term current use of insulin (H)        Use to calibrate blood glucose monitor as needed as directed.   Quantity:  1 Bottle   Refills:  3       blood glucose monitoring meter device kit   Commonly known as:  no brand specified   Used for:  Type 2 diabetes mellitus without complication, without long-term current use of insulin (H)        Use to test blood sugar 4 times daily or as directed.   Quantity:  1 kit   Refills:  0       blood glucose monitoring test strip   Commonly known as:  no brand specified   Used for:  Type 2 diabetes mellitus without complication, without long-term current use of insulin (H)        Use to test blood sugar 4 times daily or as directed.   Quantity:  100 strip   Refills:  6       carvedilol 6.25 MG tablet   Commonly known as:  COREG        Dose:  6.25 mg   Take 1 tablet (6.25 mg) by mouth 2 times daily   Quantity:  60 tablet   Refills:  3       lisinopril 2.5 MG tablet   Commonly known as:  PRINIVIL/Zestril   Used for:  Type 2 diabetes mellitus without complication, without long-term current use of insulin (H)        Dose:  2.5 mg   Take 1 tablet (2.5 mg) by mouth daily   Quantity:  30 tablet   Refills:  3       nitroGLYcerin 0.4 MG sublingual tablet   Commonly known as:  NITROSTAT        For chest pain place 1 tablet under the tongue every 5  minutes for 3 doses. If symptoms persist 5 minutes after 1st dose call 911.   Quantity:  25 tablet   Refills:  3       thin lancets   Commonly known as:  NO BRAND SPECIFIED   Used for:  Type 2 diabetes mellitus without complication, without long-term current use of insulin (H)        Use with lanceting device.   Quantity:  100 each   Refills:  3       ticagrelor 90 MG tablet   Commonly known as:  BRILINTA        Dose:  90 mg   Take 1 tablet (90 mg) by mouth every 12 hours   Quantity:  60 tablet   Refills:  3            Where to get your medicines      These medications were sent [address]  [address] [facility] [place] [address]     Phone:  [phone]     nitroGLYcerin 0.4 MG sublingual tablet         These medications were sent to [facility] [place], [place] - [address]  [address], [facility] [address]     Phone:  [phone]     alcohol swab prep pads    aspirin 81 MG EC tablet    atorvastatin 40 MG tablet    blood glucose calibration solution    blood glucose monitoring meter device kit    blood glucose monitoring test strip    carvedilol 6.25 MG tablet    lisinopril 2.5 MG tablet    thin lancets    ticagrelor 90 MG tablet                Protect others around you: Learn how to safely use, store and throw away your medicines at www.disposemymeds.org.             Medication List: This is a list of all your medications and when to take them. Check marks below indicate your daily home schedule. Keep this list as a reference.      Medications           Morning Afternoon Evening Bedtime As Needed    alcohol swab prep pads   Use to swab area of injection/tsering as directed.                                aspirin 81 MG EC tablet   Take 1 tablet (81 mg) by mouth daily   Start taking on:  5/16/2018   Last time this was given:  81 mg on 5/15/2018  8:55 AM                                   atorvastatin 40 MG tablet   Commonly known as:   LIPITOR   Take 1 tablet (40 mg) by mouth daily   Start taking on:  5/16/2018   Last time this was given:  40 mg on 5/15/2018  8:55 AM                                   blood glucose calibration solution   Commonly known as:  NO BRAND SPECIFIED   Use to calibrate blood glucose monitor as needed as directed.                                blood glucose monitoring meter device kit   Commonly known as:  no brand specified   Use to test blood sugar 4 times daily or as directed.                                blood glucose monitoring test strip   Commonly known as:  no brand specified   Use to test blood sugar 4 times daily or as directed.                                carvedilol 6.25 MG tablet   Commonly known as:  COREG   Take 1 tablet (6.25 mg) by mouth 2 times daily   Last time this was given:  6.25 mg on 5/15/2018  8:55 AM   Next Dose Due:  5/15/2018 9 pm                                      lisinopril 2.5 MG tablet   Commonly known as:  PRINIVIL/Zestril   Take 1 tablet (2.5 mg) by mouth daily   Last time this was given:  2.5 mg on 5/14/2018  9:04 PM   Next Dose Due:  5/15/2018 9 pm                                   nitroGLYcerin 0.4 MG sublingual tablet   Commonly known as:  NITROSTAT   For chest pain place 1 tablet under the tongue every 5 minutes for 3 doses. If symptoms persist 5 minutes after 1st dose call 911.   Last time this was given:  0.4 mg on 5/12/2018  9:46 PM                                   thin lancets   Commonly known as:  NO BRAND SPECIFIED   Use with lanceting device.                                ticagrelor 90 MG tablet   Commonly known as:  BRILINTA   Take 1 tablet (90 mg) by mouth every 12 hours   Last time this was given:  90 mg on 5/15/2018  8:54 AM   Next Dose Due:  5/15/2018 9 pm

## 2018-05-12 NOTE — IP AVS SNAPSHOT
[facility]    [address]    [facility] [address]    Phone:  [phone]                                       After Visit Summary   5/12/2018    Dutch Donnelly    MRN: [id number]           After Visit Summary Signature Page     I have received my discharge instructions, and my questions have been answered. I have discussed any challenges I see with this plan with the nurse or doctor.    ..........................................................................................................................................  Patient/Patient Representative Signature      ..........................................................................................................................................  Patient Representative Print Name and Relationship to Patient    ..................................................               ................................................  Date                                            Time    ..........................................................................................................................................  Reviewed by Signature/Title    ...................................................              ..............................................  Date                                                            Time

## 2018-05-12 NOTE — ED PROVIDER NOTES
"  History     Chief Complaint:  Chest Pain    The history is provided by the patient.      Dutch Donnelly is a 53 year old male who presents with chest pain. The patient reports experiencing intermittent left chest pain described as \"tightness\" that radiates into and shoots down his left arm for the last ~2 days that has worsened and became consistent at 1600 today. He endorses fatigue today, stating that he slept most of the day. Patient administered two ibuprofen two hours ago and believes that this somewhat alleviated his pain. Patient additionally endorses some shortness of breath. Patient denies diaphoresis, nausea, history of diabetes or hypertension, allergies to any medication, or other complaint.     CARDIAC RISK FACTORS:  Sex:    Male  Tobacco:   Negative  Hypertension:   Negative  Hyperlipidemia:  Negative  Diabetes:   Negative  Family History:  Negative    PE/DVT RISK FACTORS:  Cancer:   Negative  Travel:   Negative  Surgery:   Negative  Other immobilization: Negative  Personal history:  Negative    Allergies:  No known drug allergies.    Medications:    The patient is not currently taking any prescribed medications.     Past Medical History:    The patient does not have any past pertinent medical history.     Past Surgical History:    History reviewed. No pertinent surgical history.     Family History:    History reviewed. No pertinent family history.      Social History:  Presents via EMS   Tobacco use: Never smoker  Alcohol use: yes  PCP: No primary care provider on file.    Marital Status:       Review of Systems   Constitutional: Positive for fatigue. Negative for diaphoresis.   Respiratory: Positive for chest tightness and shortness of breath.    Cardiovascular: Positive for chest pain.   Gastrointestinal: Negative for nausea.   All other systems reviewed and are negative.      Physical Exam     Patient Vitals for the past 24 hrs:   BP Temp Temp src Heart Rate SpO2 Height Weight   05/12/18 " "1736 (!) 147/99 96.7  F (35.9  C) Temporal 107 99 % 1.753 m (5' 9\") 108.9 kg (240 lb)        Physical Exam  Appearance: appears stated age, well-groomed, appears fairly comfortable but visibly anxious  Eyes: EOMI, PERRL, no scleral injection, no icterus  ENT:MMM, OP clear and without erythema/edema/exudate  Cardiovascular: RRR, nl S1S2, no m/r/g, 2+ pulses in all 4 extremities, cap refill <2sec  Respiratory: CTAB, good air movement throughout, no wheezes/rhonchi/rales, no increased WOB, no retractions  Back: no lesions, no midline ttp, no muscular spasms palpated, no CVA ttp  GI: abd soft, non-distended, nttp, no HSM, no rebound, no guarding, nl BS  MSK: MAHMOOD, good tone, no bony abnormality, no joint abnormality  Skin: warm and well-perfused, no rash, no edema, no ecchymosis, nl turgor  Neuro: GCS 15, alert and oriented, no focal neuro deficits  Psych: interacts appropriately  Heme: no petechia, no purpura, no active bleeding        Emergency Department Course   ECG (17:41:34):  Rate 1103 bpm. WY interval 130. QRS duration 98. QT/QTc 354/463. P-R-T axes 29 9 25. Sinus tachycardia. Low voltage QRS. ST elevation, consider anterolateral injury or acute infarct. ** ** ACUTE MI / STEMI ** **. Abnormal ECG. Agree with computer interpretation.  Interpreted at 1747 by Nany Li MD.     Imaging:  Radiographic findings were communicated with the patient who voiced understanding of the findings.    XR Chest, 2 views:  IMPRESSION: Defibrillator pad projecting over the left hemithorax. Suboptimal inspiration. The lungs appear grossly clear. No apparent pleural effusion.    Imaging independently reviewed and agree with radiologist interpretation.     Laboratory:  CBC: WBC 19.0 (H) o/w WNL (HGB 16.0, )  BMP:  (H) o/w WNL (Creatinine 0.97)  1909: Troponin: 1.539 ()  1925: Troponin POCT: 1.08 ()  INR: 0.95  PTT: 24    Interventions:  1754: Nitroglycerin 0.4 mg sublingual    1753: Aspirin 324 mg " PO  1754: Brilinta tablet 180 mg PO  1754: Heparin loading dose 7500 units IV Bolus    Emergency Department Course:  Past medical records, nursing notes, and vitals reviewed.    1741: EKG was taken here in the ED, results as above.    1742: I performed an exam of the patient and obtained history, as documented above.    1755: Dr. Mckinney intervention cardiology consult, confirmed myocardial infarction.    1758: Troponin results as 1.08.    Findings and plan explained to the Patient who consents to admission.     1801: Discussed the patient with Dr. Mckinney, who will transfer the patient to a the Cath lab for further monitoring, evaluation, and treatment.      Impression & Plan      CMS Diagnoses: The patient has a STEMI     The patient was evaluated at 1740   Patient was transferred  to the cath lab which was activated due to ECG changes.   ASA given in the ER  Medical Decision Making:  This patient is a 53-year-old male, previously fairly healthy, who presents with chest pain and shortness of breath it has been stuttering for the past 1-2 days but is constant today.  An EKG done at triage shows acute ST elevation in anterior leads.  Patient is being diagnosed with STEMI.  He is receiving aspirin, Brilinta, heparin in the ED.  He clearly had visible anxiety with increasing hypertension and tachycardia.  Sublingual nitro was given.  Dr. MCKINNEY with interventional cardiology was present at the bedside and will be bringing the patient to the Cath Lab.    CRITICAL CARE TIME: 30 MIN    Diagnosis:    ICD-10-CM   1. STEMI (ST elevation myocardial infarction) (H) I21.3       Disposition:  Admitted to Cath lab.      I, Basim Ballard, am serving as a scribe at 5:47 PM on 5/12/2018 to document services personally performed by Nany Li MD based on my observations and the provider's statements to me.    5/12/2018    EMERGENCY DEPARTMENT     Nany Li MD  05/12/18 2055

## 2018-05-13 PROBLEM — I21.02 ST ELEVATION MYOCARDIAL INFARCTION INVOLVING LEFT ANTERIOR DESCENDING (LAD) CORONARY ARTERY (H): Status: ACTIVE | Noted: 2018-01-01

## 2018-05-13 PROBLEM — E78.5 HYPERLIPIDEMIA LDL GOAL <70: Status: ACTIVE | Noted: 2018-01-01

## 2018-05-13 PROBLEM — G47.33 OSA (OBSTRUCTIVE SLEEP APNEA): Status: ACTIVE | Noted: 2018-01-01

## 2018-05-13 PROBLEM — R73.9 HYPERGLYCEMIA: Status: ACTIVE | Noted: 2018-01-01

## 2018-05-13 PROBLEM — F17.200 SMOKER: Status: ACTIVE | Noted: 2018-01-01

## 2018-05-13 PROBLEM — D72.829 LEUKOCYTOSIS: Status: ACTIVE | Noted: 2018-01-01

## 2018-05-13 PROBLEM — I22.0: Status: ACTIVE | Noted: 2018-01-01

## 2018-05-13 NOTE — PROGRESS NOTES
05/13/18 1100   Quick Adds   Type of Visit Initial Occupational Therapy Evaluation   Living Environment   Lives With spouse;child(esperanza), dependent   Living Arrangements house   Home Accessibility stairs to enter home   Number of Stairs to Enter Home 2   Number of Stairs Within Home 0   Transportation Available car;family or friend will provide   Living Environment Comment Pt reports house has a second floor and basement, but he does not use either. Pt works full time as a  for a [employer]    Self-Care   Usual Activity Tolerance good   Current Activity Tolerance fair   Regular Exercise no   Functional Level Prior   Ambulation 0-->independent   Transferring 0-->independent   Toileting 0-->independent   Bathing 0-->independent   Dressing 0-->independent   Eating 0-->independent   Communication 0-->understands/communicates without difficulty   Swallowing 0-->swallows foods/liquids without difficulty   Cognition 0 - no cognition issues reported   Fall history within last six months no   General Information   Onset of Illness/Injury or Date of Surgery - Date 05/12/18   Referring Physician Shine   Patient/Family Goals Statement Return to home and work    Additional Occupational Profile Info/Pertinent History of Current Problem Pt is a 53 year old male who was admitted on 5/12 with STEMI and is underwent KATHY placement to mid LAD. See chart for PMH   Precautions/Limitations no known precautions/limitations   Cognitive Status Examination   Orientation orientation to person, place and time   Pain Assessment   Patient Currently in Pain No   Transfer Skill: Bed to Chair/Chair to Bed   Level of [place]: Bed to Chair stand-by assist   Transfer Skill: Sit to Stand   Level of [place]: Sit/Stand stand-by assist   Transfer Skill: Toilet Transfer   Level of [place]: Toilet stand-by assist   Bathing   Level of [place] stand-by assist   Upper Body Dressing   Level of [place]: Dress Upper Body  "independent   Lower Body Dressing   Level of [place]: Dress Lower Body stand-by assist   Toileting   Level of [place]: Toilet independent   Grooming   Level of [place]: Grooming independent   Eating/Self Feeding   Level of [place]: Eating independent   Instrumental Activities of Daily Living (IADL)   Previous Responsibilities yardwork;medication management;finances;driving;work;meal prep;housekeeping;shopping   Activities of Daily Living Analysis   Impairments Contributing to Impaired Activities of Daily Living strength decreased   General Therapy Interventions   Planned Therapy Interventions ADL retraining;IADL retraining;strengthening;home program guidelines;progressive activity/exercise;risk factor education   Clinical Impression   Criteria for Skilled Therapeutic Interventions Met yes, treatment indicated   OT Diagnosis Decreased functional tolerance for self-cares and mobility    Influenced by the following impairments Endurance, strength   Assessment of Occupational Performance 5 or more Performance Deficits   Identified Performance Deficits Yardwork, work, shopping, housekeeping, cooking   Clinical Decision Making (Complexity) Low complexity   Therapy Frequency 2 times/day   Predicted Duration of Therapy Intervention (days/wks) 3 days   Anticipated Discharge Disposition Home with Outpatient Therapy   Risks and Benefits of Treatment have been explained. Yes   Patient, Family & other staff in agreement with plan of care Yes   [facility] TM \"6 Clicks\"   2016, Trustees of [facility], under license to [employer].  All rights reserved.   6 Clicks Short Forms Daily Activity Inpatient Short Form   [facility]  \"6 Clicks\" Daily Activity Inpatient Short Form   1. Putting on and taking off regular lower body clothing? 3 - A Little   2. Bathing (including washing, rinsing, drying)? 3 - A Little   3. Toileting, which includes using toilet, bedpan or urinal? 4 - None "   4. Putting on and taking off regular upper body clothing? 4 - None   5. Taking care of personal grooming such as brushing teeth? 4 - None   6. Eating meals? 4 - None   Daily Activity Raw Score (Score out of 24.Lower scores equate to lower levels of function) 22   Total Evaluation Time   Total Evaluation Time (Minutes) 10
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
## 2018-05-13 NOTE — CONSULTS
"BRIEF NUTRITION NOTE:    Received routine Nutrition Consult s/p angiogram --> PCI with KATHY placement for \"Dietitian to see for Heart Healthy Diet education\".  Note weight gain 40 lbs x 1 year.  Lipids elevated.  Hgb A1C 7.1 (H).  BGM: 174, 184, 176 --> High SSI    Recommend add Moderate Consistent CHO (5468-3021 kcals per day) to diet order.  Will provide diet education prior to discharge.    Marisol Knight, RD, LD, CNSC  "

## 2018-05-13 NOTE — PHARMACY-ADMISSION MEDICATION HISTORY
Admission Medication History    Admission medication history interview status for the 5/12/2018 admission is complete.     Patient reports using no medications prior to this admission.      Kenji Rodriguez, PetersonD

## 2018-05-13 NOTE — PLAN OF CARE
Problem: Patient Care Overview  Goal: Plan of Care/Patient Progress Review  Outcome: Therapy, progress toward functional goals is gradual  SL nitro given for 1/10 CP/L elbow numbness - pt lightheaded, flushed SBP 62 at lowest point, SBP stable after 300 cc NS bolus/[place] position. Fem line pulled - symptomatic hypotension - 1 liter bolus given/epi - see house NP note. R groin CDI - soft, no hematoma, no bruit/thrill present. C/o low back pain 6/10 - tylenol/norco given x2. Off bedrest since 0605. Voiding adequately. SBP soft 90's, pt asymptomatic at this point. Up to chair w SBA, denies any lightheadedness, dizziness/SOB. Diet advanced. Will continue to monitor.     Problem: Cardiac Catheterization (Diagnostic/Interventional) (Adult)  Goal: Signs and Symptoms of Listed Potential Problems Will be Absent, Minimized or Managed (Cardiac Catheterization)  Signs and symptoms of listed potential problems will be absent, minimized or managed by discharge/transition of care (reference Cardiac Catheterization (Diagnostic/Interventional) (Adult) CPG).   Outcome: Therapy, unable to show any progress toward functional goals  Soft BPs

## 2018-05-13 NOTE — PLAN OF CARE
Problem: Patient Care Overview  Goal: Plan of Care/Patient Progress Review  Outcome: No Change  VSS, no complaints, working with rehab, potential new diabetes, monitoring blood sugars and using SSI. SR, Independently up in room/halls, RA. Medications starting slowly as pt had hypotension last night. R karen CDI. UAD and diabetes book in room, pt and family are slowly beginning education. Monitor for signs of ETOH withdrawal as family states pt is a drinker.

## 2018-05-13 NOTE — PLAN OF CARE
OT/CR: Pt is a 53 year old male who was admitted on 5/12 with STEMI and underwent KATHY placement to mid LAD. Prior to admission pt was residing with his wife and children in a two story house with 2 stairs to enter. Pt does not use basement or second floor. Pt reports independence with all ADL/IADLs at baseline and works full time as a GM for a hotel. Pt reports he walks a great deal for his job, but was not participating in regular exercise.     Discharge Planner OT   Patient plan for discharge: Home   Current status: Provided pt with educational handouts regarding healing after a heart attack, safe activities at discharge, effort scale, OP CR, signs and symptoms of exercise/activity intolerance, and cardiac risk factors and reviewed. Pt demonstrated verbal understanding. Pt ambulated approximately 200 feet in hallway with SBA due to this being pt's first time ambulating since admission and exercised on the treadmill x 1:30 at .08-1.1 mph. Stable CV response noted during session however, pt becomes very fatigued and SOB with minimal activity. /65 and HR 95 at start of session. /77, , and O2 97% on room air post activity.   Barriers to return to prior living situation: Anticipate pt will progress well in therapy and be able to return home   Recommendations for discharge: Home with assist as needed with IADLs and OP CR at [facility]  Rationale for recommendations: Pt would benefit from continued monitored exercise and risk factor education        Entered by: Desire Mcneil 05/13/2018 1:02 PM

## 2018-05-13 NOTE — CODE/RAPID RESPONSE
"[facility]  House IMANI RRT Note  5/13/2018   Time Called: 2348  RRT called for: \"hypotension post line pull\"  Code Status: No Order, presumably full code.  Order should be placed by primary team.    Assessment & Plan   I was paged to the bedside to evaluate Mr. Dutch Donnelly for an acute episode of hypotension, and hemodynamic instability after right femoral sheath removal.  Patient was admitted a few hours prior post mid LAD stenting for an acute MI.  Patient had a drug-eluting stent placed through a right femoral 6 Jamaican sheath.  Sheath was pulled at 2338, there were no immediate site complications.  When I arrived at the patient's bedside he was diaphoretic, pale, and had a systolic pressure in the 70s.  A fluid bolus had already been initiated by bedside staff.  I took over holding manual groin pressure to ensure hemostasis.  While holding pressure patient had an episode of bradycardia into the 50s with accompanying severe lightheadedness.  2 mcg of epinephrine was drawn up and administered.  Patient tolerated this well, heart rate never got above 95, and this resolved his diaphoresis and lightheadedness. 12.5 mcg of IV fentanyl was administered for complaints of low back pain.  I assessed the patient's back it did not appear that there is an RP bleed.  At 0005 hemostasis was obtained of the right femoral groin site.      Interventions ordered/provided:  -- Manual groin pressure - hemostasis was obtained at 0005  -- 2 mcg of IV epinephrine (1ml of 1:10,000 epi was diluted into 9cc of saline = 10mcg/mL, 0.2mLs of diluted epinephrine was administered).  -- 1 L normal saline bolus  -- 12.5 mcg IV fentanyl    At the conclusion of this RRT patient was hemodynamically stable with systolic pressures in the 110's heart rates in the 80s and 90s, patient was resting comfortably.    Interval History     Dutch Donnelly is a 53 year old male with no significant past medical history who was admitted on " 5/12/2018 for and acute STEMI.  Patient had intermittent left chest pain described as tightness which radiated down his left arm over the past 2 days.  Today the sensation worsened and became consistent around 4 PM.  Patient endorses increased fatigue.  Patient took 2 ibuprofen at home, which somewhat alleviated his pain.  Patient noticed increased shortness of breath and came to the emergency department.  The EKG performed in the ED showed ST elevation in his anterior leads, patient was taken to the Cath Lab after receiving a heparin bolus and Brilinta.  Patient then had drug-eluting stent placed to his mid LAD to right femoral access.    Medical history significant for:  History reviewed. No pertinent past medical history.  History reviewed. No pertinent surgical history.    Allergies   No Known Allergies    Physical Exam   Physical Exam   Constitutional: He is oriented to person, place, and time. He appears distressed.   HENT:   Head: Normocephalic and atraumatic.   Eyes: Pupils are equal, round, and reactive to light.   Neck: Normal range of motion.   Cardiovascular: Normal rate, regular rhythm, normal heart sounds and intact distal pulses.  Exam reveals no gallop and no friction rub.    No murmur heard.  Pulmonary/Chest: Effort normal and breath sounds normal.   Abdominal: Soft. He exhibits no distension.   Musculoskeletal: Normal range of motion.   Neurological: He is alert and oriented to person, place, and time. GCS score is 15.   Skin: He is diaphoretic. There is pallor.   Psychiatric: Mood and affect normal.       Vital Signs with Ranges:  Temp:  [96.7  F (35.9  C)-98.1  F (36.7  C)] 98.1  F (36.7  C)  Heart Rate:  [] 90  Resp:  [8-28] 15  BP: ()/() 94/70  SpO2:  [96 %-100 %] 100 %  I/O last 3 completed shifts:  In: -   Out: 1300 [Urine:1300]    Data     EKG:  -none performed    ABG:  -No lab results found in last 7 days.    Troponin:    Recent Labs   Lab Test  05/12/18 2010 05/12/18    1749   TROPI  113.545*   --    TROPONIN   --   1.08*       IMAGING: (X-ray/CT/MRI)   Recent Results (from the past 24 hour(s))   XR Chest Port 1 View    Narrative    XR CHEST PORT 1 VW 5/12/2018 6:02 PM     HISTORY: Chest pain;       Impression    IMPRESSION: Defibrillator pad projecting over the left hemithorax.  Suboptimal inspiration. The lungs appear grossly clear. No apparent  pleural effusion.    ALONSO MARK MD       CBC with Diff:  Recent Labs   Lab Test  05/12/18   1740   WBC  19.0*   HGB  16.0   MCV  91   PLT  240   INR  0.95      No results found for: RETICABSCT  No results found for: RETP    Lactic Acid:    No results found for: LACT        Comprehensive Metabolic Panel:    Recent Labs  Lab 05/12/18  1740      POTASSIUM 3.8   CHLORIDE 105   CO2 23   ANIONGAP 9   *   BUN 13   CR 0.97   GFRESTIMATED 81   GFRESTBLACK >90   CAMILO 9.3       INR:    Recent Labs   Lab Test  05/12/18   1740   INR  0.95       D-DIMER:  No components found for: DDIMER    BNP:  No results found for: BNP    UA:  No results for input(s): COLOR, APPEARANCE, URINEGLC, URINEBILI, URINEKETONE, SG, UBLD, URINEPH, PROTEIN, UROBILINOGEN, NITRITE, LEUKEST, RBCU, WBCU in the last 168 hours.    Time Spent on this Encounter   I spent 50 minutes ([phone]) on the unit/floor managing the care of Dutch Donnelly. 100% of my time was spent counseling the patient and/or coordinating care regarding services listed in this note.    PITO Anglin, CNP  Hospitalist - House IMANI  Text Page  [phone])

## 2018-05-13 NOTE — PROVIDER NOTIFICATION
MD Notification    Notified Person: MD    Notified Person Name: Dr Campuzano    Notification Date/Time:    Notification Interaction:    Purpose of Notification: Sepsis BPA - 2.7 lactic acid     Orders Received: Monitor, give medications prescribed by primary MD    Comments:Anterior MI may cause lactic acid increase

## 2018-05-13 NOTE — PROGRESS NOTES
Brilinta Insurance Coverage Check per Discharge Pharmacey ([place]):    Patient's co-pay would be $25 per month and also qualifies for 's discount, which would bring it down to $6 per month.

## 2018-05-13 NOTE — PROGRESS NOTES
[facility]    Cardiology Progress Note     Assessment & Plan   Dutch Donnelly is a 53 year old male who was admitted on 5/12/2018. Patient has no significant background history but yesterday developed chest pains secondary to an anterior STEMI due to an occlusion of the middle LAD. Patient is now s/p PCI with KATHY placement.    #1 Anterior STEMI s/p PCI with KATHY to the middle LAD  #2 Coronary Artery Disease  - continue ASA and ticagrelor  - continue Lipitor 40 mg daily  - will increase Coreg 6.25 BID  - holding ACEI and nitrates for now given recent hypotensive spell  - awaiting TTE  - ensure cardiac rehabilitation    Juan Campuzano MD    Interval History   Last evening the patient became hypotensive and bradycardic to the 50s.   This appears to have started after nitrate initiation.  An RRT was called.  Patient was provided IVFs and IV epinephrine.  At the conclusion of the RRT patient was hemodynamically stable with systolic pressures in the 110's heart rates in the 80s and 90s, patient was resting comfortably.    This morning, patient reports low grade chest discomfort.  Otherwise feels well.    Physical Exam   Temp: 97.5  F (36.4  C) Temp src: Oral BP: 109/77   Heart Rate: 69 Resp: 14 SpO2: 95 % O2 Device: None (Room air) Oxygen Delivery: 2 LPM  Vitals:    05/12/18 1736 05/13/18 0400   Weight: 108.9 kg (240 lb) 115.7 kg (255 lb)     Vital Signs with Ranges  Temp:  [96.7  F (35.9  C)-98.1  F (36.7  C)] 97.5  F (36.4  C)  Heart Rate:  [] 69  Resp:  [8-28] 14  BP: ()/() 109/77  SpO2:  [93 %-100 %] 95 %  I/O last 3 completed shifts:  In: 400 [P.O.:400]  Out: 1850 [Urine:1850]  Patient Active Problem List   Diagnosis     STEMI (ST elevation myocardial infarction) (H)       General:  Well-appearing, appears as stated age, friendly, sensorium clear, cognition intact, friendly, cooperative.  HEENT:  No major abnormalities.  Vessels:  Nonelevated JVP.  No carotid bruits.  Normal carotid  upstrokes.  Heart:  Normal S1, split S2.  No murmurs appreciated.  No S3 or S4.  No RV lift.  Non-displaced apical impulse.    Lungs:  Clear to auscultation bilaterally.  No wheezes, rales, rhonchi.  Adequate air movement.  Breathing comfortably.  Abdomen:  Soft, nontender, nondistended.   Extremities:  No edema.  Normal perfusion.    Medications     Percutaneous Coronary Intervention orders placed (this is information for BPA alerting)         aspirin  81 mg Oral Daily     atorvastatin  40 mg Oral Daily     carvedilol  3.125 mg Oral BID     furosemide  20 mg Intravenous Daily     lisinopril  10 mg Oral Daily     ticagrelor  90 mg Oral Q12H       Data   Results for orders placed or performed during the hospital encounter of 05/12/18 (from the past 24 hour(s))   CBC with platelets differential   Result Value Ref Range    WBC 19.0 (H) 4.0 - 11.0 10e9/L    RBC Count 4.90 4.4 - 5.9 10e12/L    Hemoglobin 16.0 13.3 - 17.7 g/dL    Hematocrit 44.7 40.0 - 53.0 %    MCV 91 78 - 100 fl    MCH 32.7 26.5 - 33.0 pg    MCHC 35.8 31.5 - 36.5 g/dL    RDW 14.5 10.0 - 15.0 %    Platelet Count 240 150 - 450 10e9/L    Diff Method Automated Method     % Neutrophils 53.8 %    % Lymphocytes 35.4 %    % Monocytes 7.5 %    % Eosinophils 2.4 %    % Basophils 0.3 %    % Immature Granulocytes 0.6 %    Nucleated RBCs 0 0 /100    Absolute Neutrophil 10.2 (H) 1.6 - 8.3 10e9/L    Absolute Lymphocytes 6.7 (H) 0.8 - 5.3 10e9/L    Absolute Monocytes 1.4 (H) 0.0 - 1.3 10e9/L    Absolute Eosinophils 0.5 0.0 - 0.7 10e9/L    Absolute Basophils 0.1 0.0 - 0.2 10e9/L    Abs Immature Granulocytes 0.1 0 - 0.4 10e9/L    Absolute Nucleated RBC 0.0     RBC Morphology Normal     Platelet Estimate       Automated count confirmed.  Platelet morphology is normal.   INR   Result Value Ref Range    INR 0.95 0.86 - 1.14   Partial thromboplastin time   Result Value Ref Range    PTT 24 22 - 37 sec   Basic metabolic panel   Result Value Ref Range    Sodium 137 133 - 144  mmol/L    Potassium 3.8 3.4 - 5.3 mmol/L    Chloride 105 94 - 109 mmol/L    Carbon Dioxide 23 20 - 32 mmol/L    Anion Gap 9 3 - 14 mmol/L    Glucose 195 (H) 70 - 99 mg/dL    Urea Nitrogen 13 7 - 30 mg/dL    Creatinine 0.97 0.66 - 1.25 mg/dL    GFR Estimate 81 >60 mL/min/1.7m2    GFR Estimate If Black >90 >60 mL/min/1.7m2    Calcium 9.3 8.5 - 10.1 mg/dL   Troponin I   Result Value Ref Range    Troponin I ES 1.539 (HH) 0.000 - 0.045 ug/L   EKG 12 lead   Result Value Ref Range    Interpretation ECG Click View Image link to view waveform and result    Troponin POCT   Result Value Ref Range    Troponin I 1.08 (HH) 0.00 - 0.10 ug/L   XR Chest Port 1 View    Narrative    XR CHEST PORT 1 VW 5/12/2018 6:02 PM     HISTORY: Chest pain;       Impression    IMPRESSION: Defibrillator pad projecting over the left hemithorax.  Suboptimal inspiration. The lungs appear grossly clear. No apparent  pleural effusion.    ALONSO MARK MD   Activated clotting time POCT   Result Value Ref Range    Activated Clot Time 607 (H) 75 - 150 sec   Heart Cath Left heart cath    Narrative     CARDIAC CATH REPORT:     PROCEDURES PERFORMED:   Retrograde right femoral artery access  Coronary Angiography  Left Heart Catheterization  Percutaneous Coronary Intervention of the mid LAD    PHYSICIANS:  Attending Physician: Sherman Mckinney MD  Interventional Cardiology Fellow: Hola Riojas MD    INDICATION:  Dutch Donnelly is a 53 year old male with no significant past medical  history presenting in with chest pain and EKG with anterior ST  elevation. He presents on an emergent basis. The clinical presentation  was ACS <= 24 . PCI was performed during this procedure.  The  indication was STEMI - Immediate PCI for Acute STEMI.      DESCRIPTION:  1. Consent obtained with discussion of risks.  All questions were  answered.  2. Sterile prep and procedure.  3. Location with Sheaths:   Rt Femoral Arterial  6 Fr  10 cm [short]  4. Access: Local anesthetic with  lidocaine.  A standard 18 guage  needle  was used to establish vascular access using a modified  Seldinger technique.  5. Diagnostic Catheters:   6 Fr  3DRC and JL 4  6. Guiding Catheters:  6 Fr  EBU LF 3.75 GC  6. Estimated blood loss: < 25 ml    MEDICATIONS:  The procedure was performed under conscious sedation for 36 minutes  from 18:09 to 18:45.  The patient was assessed immediately before the first sedation  medication was administered.  Midazolam 2 mg and Fentanyl 200 mcg were  administered.  Heart rate, BP, respiration, oxygen saturation and patient responses  were monitored throughout the procedure with the assistance of the RN  under my supervision.  >> IV Angiomax was administered to achieve anticoagulation.  >> Antiplatelet Therapy: ASA 81 mg  or Ticagrelor 180 mg     Procedures:    CORONARY ANGIOGRAM:   1. Both coronary arteries arise from their respective cusps.  2. Dominance: Right  3. The LM had 40% distal stenosis. Other than giving rise to LAD and  circumflex artery, there is a small atrial branch arising from the  left main artery.  4. LAD: Type 3 [LAD supplies the entire apex].. The LAD gives rise to  septal perforators, small to medium caliber but short D1 and medium  caliber and long D2.  There is subtotal 99% occlusion of the mid LAD.  There is also 70% stenosis of the ostium of D1.  5. LCX gives rise to tiny OM1 and then continues as medium size OM2  vessels. The OM2 then gives of small distal branches. There is 30%  stenosis of the proximal OM2. There are mild irregularities noted in  the remaining segments of OM.  6. RCA gives rise to PL branches and supplies PDA. There is 10%  stenosis noted in the proximal segment of RCA and proximal PL/PDA .  There are collaterals noted from the RCA to the LAD.     LEFT HEART CATHETERIZATION:  1. LVEDP 35 mmHg.  2. No gradient across the aortic valve on pull back.    PERCUTANEOUS CORONARY INTERVENTION:    LAD: mid  A 6 Fr  EBU LF 3.75 GC was positioned at  "the ostium of the LMCA.  >> IV Angiomax was administered to achieve anticoagulation.    A 0.014\" runthrough wire was advanced across the mid LAD lesion and  positioned in the distal segment.  A 2.75x 12 mm balloon was used to  pre-dilate the lesion.  A 3.0 x 24 mm Synergy drug eluting stent was  successfully deployed across the mid LAD lesion (proximal to the D2)  with inflation to 4 liliana.  The stent was post-dilated with a 3.5 x 12  mm non-compliant balloon upt o14 liliana. Patient continued to have chest  pain. IC nitroglycerin and adenosine given. Pain got better from 9/10  to 4/10. Final angiography showed no evidence of perforation or  dissection with residual stenosis of 0% and RASHI 3 flow.  No  complications.    Sheath Removal:  The right femoral sheath will be removed after the patient has been  transferred to the unit.    Contrast: Isovue, 160 ml     Fluoroscopy Time: 7.1 min    COMPLICATIONS:  1. None    SUMMARY:   Single vessel obstructive CAD - LAD   -  40% distal left main stenosis  -Subtotal 99% occlusion of the mid LAD  -70% stenosis of the ostium of D1( However, it was a short vessel)  -30% stenosis of the proximal OM2    Successful deployment of a drug eluting stent    -3.0 x 24 mm Synergy drug eluting stent across the mid LAD with  post-dilation with a 3.5 x 12 mm non-compliant balloon                                                   Elevated Left ventricular end diastolic pressure at 35 mm Hg    PLAN:   >> ASA 81 mg qd and Ticagrelor 90 mg BID  >> Bedrest per protocol.  >> Continued medical management and lifestyle modification for  cardiovascular risk factor optimization.   >>. Admit to inpatient  >> To give lasix for increased Left ventricular end diastolic  pressure. Will need echo to access, wall function         The attending interventional cardiologist was present and supervised  all critical aspects the procedure.      Hola Riojas MD  Cardiology Fellow    Sherman Mckinney MD  Cardiology " Staff           Lactic acid level STAT for sepsis protocol   Result Value Ref Range    Lactate for Sepsis Protocol 2.7 (HH) 0.4 - 1.9 mmol/L   Lipid Profile   Result Value Ref Range    Cholesterol 323 (H) <200 mg/dL    Triglycerides 225 (H) <150 mg/dL    HDL Cholesterol 47 >39 mg/dL    LDL Cholesterol Calculated 231 (H) <100 mg/dL    Non HDL Cholesterol 276 (H) <130 mg/dL   Troponin I   Result Value Ref Range    Troponin I .545 (HH) 0.000 - 0.045 ug/L   EKG 12-lead, tracing only    Result Value Ref Range    Interpretation ECG Click View Image link to view waveform and result    Basic metabolic panel   Result Value Ref Range    Sodium 138 133 - 144 mmol/L    Potassium 3.9 3.4 - 5.3 mmol/L    Chloride 107 94 - 109 mmol/L    Carbon Dioxide 20 20 - 32 mmol/L    Anion Gap 11 3 - 14 mmol/L    Glucose 174 (H) 70 - 99 mg/dL    Urea Nitrogen 12 7 - 30 mg/dL    Creatinine 0.93 0.66 - 1.25 mg/dL    GFR Estimate 85 >60 mL/min/1.7m2    GFR Estimate If Black >90 >60 mL/min/1.7m2    Calcium 8.4 (L) 8.5 - 10.1 mg/dL   CBC with platelets   Result Value Ref Range    WBC 18.4 (H) 4.0 - 11.0 10e9/L    RBC Count 4.47 4.4 - 5.9 10e12/L    Hemoglobin 14.4 13.3 - 17.7 g/dL    Hematocrit 40.5 40.0 - 53.0 %    MCV 91 78 - 100 fl    MCH 32.2 26.5 - 33.0 pg    MCHC 35.6 31.5 - 36.5 g/dL    RDW 14.4 10.0 - 15.0 %    Platelet Count 215 150 - 450 10e9/L   Troponin I   Result Value Ref Range    Troponin I .994 (HH) 0.000 - 0.045 ug/L   Glucose by meter   Result Value Ref Range    Glucose 184 (H) 70 - 99 mg/dL       Recent Labs  Lab 05/13/18  0355 05/12/18 2010 05/12/18  1749 05/12/18  1740   WBC 18.4*  --   --  19.0*   HGB 14.4  --   --  16.0   MCV 91  --   --  91     --   --  240   INR  --   --   --  0.95     --   --  137   POTASSIUM 3.9  --   --  3.8   CHLORIDE 107  --   --  105   CO2 20  --   --  23   BUN 12  --   --  13   CR 0.93  --   --  0.97   ANIONGAP 11  --   --  9   CAMILO 8.4*  --   --  9.3   *  --   --   195*   TROPI 148.994* 113.545*  --  1.539*   TROPONIN  --   --  1.08*  --      Recent Results (from the past 24 hour(s))   XR Chest Port 1 View    Narrative    XR CHEST PORT 1 VW 5/12/2018 6:02 PM     HISTORY: Chest pain;       Impression    IMPRESSION: Defibrillator pad projecting over the left hemithorax.  Suboptimal inspiration. The lungs appear grossly clear. No apparent  pleural effusion.    ALONSO MARK MD

## 2018-05-14 PROBLEM — R73.9 HYPERGLYCEMIA: Status: RESOLVED | Noted: 2018-01-01 | Resolved: 2018-01-01

## 2018-05-14 PROBLEM — E11.9 TYPE 2 DIABETES MELLITUS WITHOUT COMPLICATION (H): Status: ACTIVE | Noted: 2018-01-01

## 2018-05-14 NOTE — PROGRESS NOTES
A/O. VSS, no complaints overnight.  overnight. Up ad deshaun. Norco given x1 for low back pain. R groin CDI. Will continue to monitor.

## 2018-05-14 NOTE — CONSULTS
"NUTRITION EDUCATION    REASON FOR ASSESSMENT:  Nutrition education on American Heart Association (AHA) Heart Healthy Diet.    NUTRITION HISTORY:  Visited with pt this morning  Pt and his wife have been ordering delivery meals 3 times per week for dinner  He typically skips breakfast  Lunch is eaten in the cafeteria (pt works at the [employer]) - usually a salad or meat/veggie entree  Dinner - eaten at home, chicken/beef (only has fish when he eats out), veggie  Pt drinks a flavored water during the day (\"I'm sure it has sugar in it\")    Pt has the UAD binder and the DM booklet  He says that he has not been told he has DM - \"everything seems very hush hush\"      CURRENT DIET ORDER:  2400mg Na, LSF    NUTRITION DIAGNOSIS:  Food- and nutrition-related knowledge deficit R/t no prior diet education AEB this is a new dx for pt     INTERVENTIONS:  Nutrition Prescription:    Recommended AHA Heart Healthy Diet    Implementation:     Nutrition Education (Content):  a) reviewed AHA Heart Healthy Diet guidelines and basics of DM/CHO foods  b) provided additional Healthy Heart diet handout - Mediterranean Diet    Nutrition Education (Application):  a) Discussed current eating habits and recommended alternative food choices.  Encouraged pt to avoid sugared beverages, choose whole grains,and limit portions of foods that have CHOs.    Anticipate good compliance    Diet Education - refer to Education flowsheet    Goals:    Patient verbalizes understanding of diet     All of the above goals met during education session    Follow Up/Monitoring:    Provided RD contact information for future questions        "

## 2018-05-14 NOTE — PROGRESS NOTES
[facility]    Hospitalist Progress Note    Assessment & Plan   Dutch Donnelly is a 53 year old male who was admitted on 5/12/2018 with:      Impression:   Principal Problem:    STEMI -- S/P KATHY to mid LAD 5/12/18  Active Problems:    Hyperlipidemia LDL goal <70    Leukocytosis    Smoker    JUN (obstructive sleep apnea) -- has CPAP    DM Type 2, new onset -- Hgb A1C 7.1 on 5/13/18      Plan:  Will order glucometer and start diabetic teaching.  Discussed with Cardiology, Dr Phillips, will try to add Ace inhibitor at some point if BP tolerates it, anticipate discharge tomorrow if remains stable.  Encouraged use of CPAP for JUN (didn't use it last night, agrees to use it tonight).      DVT Prophylaxis: Pneumatic Compression Devices  Code Status: No Order    Disposition: Expected discharge in 1 days once BP stable and remains pain free.    Benjy Souza MD  Pager [phone]  Cell Phone [phone]  Text Page (7am to 6pm)    Interval History   No chest pain, did not use CPAP last night (has not been using it at home).      Physical Exam   Temp: 98.7  F (37.1  C) Temp src: Oral BP: 104/72 Pulse: 98 Heart Rate: 112 Resp: 16 SpO2: 96 % O2 Device: None (Room air)    Vitals:    05/12/18 1736 05/13/18 0400 05/14/18 0626   Weight: 108.9 kg (240 lb) 115.7 kg (255 lb) 106.6 kg (235 lb)     Vital Signs with Ranges  Temp:  [97.8  F (36.6  C)-98.7  F (37.1  C)] 98.7  F (37.1  C)  Pulse:  [96-98] 98  Heart Rate:  [] 112  Resp:  [15-19] 16  BP: ()/(64-75) 104/72  SpO2:  [95 %-99 %] 96 %  I/O last 3 completed shifts:  In: 1000 [P.O.:1000]  Out: -     # Pain Assessment:  Current Pain Score 5/14/2018   Patient currently in pain? denies   Pain score (0-10) -   Pain location -   Pain descriptors -   Dutch jackson pain level was assessed and he currently denies pain.        Constitutional: Awake, alert, cooperative, no apparent distress  Respiratory: Clear to auscultation bilaterally, no crackles or  wheezing  Cardiovascular: Regular rate and rhythm, normal S1 and S2, and no murmur noted  GI: Normal bowel sounds, soft, non-distended, non-tender  Extrem: No calf tenderness, no ankle edema  Neuro: Ox3, no focal motor or sensory deficits    Medications     Percutaneous Coronary Intervention orders placed (this is information for BPA alerting)         aspirin  81 mg Oral Daily     atorvastatin  40 mg Oral Daily     carvedilol  6.25 mg Oral BID     insulin aspart  1-10 Units Subcutaneous TID AC     insulin aspart  1-7 Units Subcutaneous At Bedtime     ticagrelor  90 mg Oral Q12H       Data     Recent Labs  Lab 05/13/18  0355 05/12/18 2010 05/12/18  1749 05/12/18  1740   WBC 18.4*  --   --  19.0*   HGB 14.4  --   --  16.0   MCV 91  --   --  91     --   --  240   INR  --   --   --  0.95     --   --  137   POTASSIUM 3.9  --   --  3.8   CHLORIDE 107  --   --  105   CO2 20  --   --  23   BUN 12  --   --  13   CR 0.93  --   --  0.97   ANIONGAP 11  --   --  9   CAMILO 8.4*  --   --  9.3   *  --   --  195*   TROPI 148.994* 113.545*  --  1.539*   TROPONIN  --   --  1.08*  --        Imaging:   No results found for this or any previous visit (from the past 24 hour(s)).

## 2018-05-14 NOTE — PLAN OF CARE
"Problem: Patient Care Overview  Goal: Plan of Care/Patient Progress Review  Discharge Planner OT   Patient plan for discharge: home  Current status: pt ambulated approx 100 ft and 120 ft, completed TDM at .9 mph for 2.5 mins and completed 15 stairs, pt reports SOB and fatigue and required 1 seated rest break after stairs, cues for PLB, O2 sats WFL RA and stable CV response, see vital sign flow sheet for details. Cont'd education in CAD risk factors with focus on quitting smoking, low sodium and cholesterol diet and exercise, pt appears to be in the contemplation stage of change and wants to quit \"cold turkey.\"  2nd session: completed TDM at gradual increase to 1.2 mph for 4 mins, encouraged to gradually cool down instead of stopping when at the top of the curve. Pt reports fatigue and SOB, cues for PLB, O2 sats 96% RA and BP hypotensive response/blunted. Pt denies dizziness, see vital sign flow sheet for details.   Barriers to return to prior living situation: none with family A with IADL's as needed ie heavier yardwork and cleaning, per MD driving and return to work.   Recommendations for discharge: home with family A with IADl's as needed as above and OP CR at [facility].   Rationale for recommendations: OP CR for monitored progressive exercise and risk factor education and modification.        Entered by: Desire Gamez 05/14/2018 8:45 AM           "

## 2018-05-14 NOTE — PLAN OF CARE
Problem: Patient Care Overview  Goal: Plan of Care/Patient Progress Review  Outcome: Improving  VSS, BP soft at times. Denies pain. Pt check evening BG and administered insulin. Tolerating cardiac rehab. Plan for discharge tomorrow. Will continue to monitor.

## 2018-05-14 NOTE — PROGRESS NOTES
[facility]    Cardiology Progress Note     Assessment & Plan   Dutch Donnelly is a 53 year old male who was admitted on 5/12/2018. Patient has no significant background history but yesterday developed chest pains secondary to an anterior STEMI due to an occlusion of the middle LAD. Patient is now s/p PCI with KATHY placement.    #1 Anterior STEMI s/p PCI with KATHY to the middle LAD  #2 Coronary Artery Disease  - continue ASA and ticagrelor  - continue Lipitor 40 mg daily  - will increase Coreg 6.25 BID  - holding ACEI due to relative hypotension  - conitinue cardiac rehabilitation  #3  If does well home tomorrow.        Elsie Phillips MD    Interval History     No issues overnight.  Has walked with cardiac rehab.  No return of symptoms.      Physical Exam   Temp: 98.7  F (37.1  C) Temp src: Oral BP: 104/72 Pulse: 98 Heart Rate: 112 Resp: 16 SpO2: 96 % O2 Device: None (Room air)    Vitals:    05/12/18 1736 05/13/18 0400 05/14/18 0626   Weight: 108.9 kg (240 lb) 115.7 kg (255 lb) 106.6 kg (235 lb)     Vital Signs with Ranges  Temp:  [97.8  F (36.6  C)-98.7  F (37.1  C)] 98.7  F (37.1  C)  Pulse:  [96-98] 98  Heart Rate:  [] 112  Resp:  [15-19] 16  BP: ()/(54-89) 104/72  SpO2:  [95 %-99 %] 96 %  I/O last 3 completed shifts:  In: 1000 [P.O.:1000]  Out: -   Patient Active Problem List   Diagnosis     STEMI (ST elevation myocardial infarction) (H)       General:  Well-appearing, appears as stated age, friendly, sensorium clear, cognition intact, friendly, cooperative.  HEENT:  No major abnormalities.  Vessels:  Nonelevated JVP.  No carotid bruits.  Normal carotid upstrokes.  Heart:  Normal S1, split S2.  No murmurs appreciated.  No S3 or S4.  No RV lift.  Non-displaced apical impulse.    Lungs:  Clear to auscultation bilaterally.  No wheezes, rales, rhonchi.  Adequate air movement.  Breathing comfortably.  Abdomen:  Soft, nontender, nondistended.   Extremities:  No edema.  Normal  perfusion.    Medications     Percutaneous Coronary Intervention orders placed (this is information for BPA alerting)         aspirin  81 mg Oral Daily     atorvastatin  40 mg Oral Daily     carvedilol  6.25 mg Oral BID     insulin aspart  1-10 Units Subcutaneous TID AC     insulin aspart  1-7 Units Subcutaneous At Bedtime     ticagrelor  90 mg Oral Q12H       Data   Results for orders placed or performed during the hospital encounter of 18 (from the past 24 hour(s))   Glucose by meter   Result Value Ref Range    Glucose 176 (H) 70 - 99 mg/dL   ECHO COMPLETE WITH OPTISON    Narrative    [id number]  [facility] access hospital73  [id number]  [id number]^STEPHANIE^JAZZMINE^MERARI           [facility]  Echocardiography Laboratory  [address] [place] [address]        Name: SNEHAL ESCALANTE  MRN: [id number]  : 1965  Study Date: 2018 02:00 PM  Age: 53 yrs  Gender: Male  Patient Location: [facility]  Reason For Study: CAD  Ordering Physician: JAZZMINE BROWN  Performed By: Floyd Sears RDCS     BSA: 2.3 m2  Height: 69 in  Weight: 255 lb  HR: 95  BP: 109/77 mmHg  _____________________________________________________________________________  __        Procedure  Complete Portable Echo Adult. Contrast Optison.  _____________________________________________________________________________  __        Interpretation Summary     The rhythm was sinus with wide QRS.  Left ventricular systolic function is mild to moderately reduced. The visual  ejection fraction is estimated at 40-45% and the biplane calculated LVEF =  42%.  This decrease in the LVEF is due mainly to moderate to severe anteroseptal  apical hypokinesis.  Normal left atrial size by volume at 19 ml/m2.  There is trivial trileaflet aortic sclerosis.  Right ventricular systolic pressure could not be approximated due to  inadequate tricuspid regurgitation but the inferior vena cava is not dilated  at 2.3  cm.  _____________________________________________________________________________  __        Left Ventricle  The left ventricle is normal in size. There is normal left ventricular wall  thickness. The visual ejection fraction is estimated at 40-45%. Left  ventricular systolic function is mild to moderately reduced. The biplane  calculated LVEF = 42%. Grade I or early diastolic dysfunction. Diastolic  Doppler findings (E/E' ratio and/or other parameters) suggest left ventricular  filling pressures are indeterminate. There is moderate to severe anterior,  septal, and apical wall hypokinesis. There is no thrombus seen in the left  ventricle.     Right Ventricle  Normal right ventricle structure and size. The right ventricular systolic  function is normal.     Atria  Normal left atrial size. by volume at 19 ml/m2. Right atrial size is normal.  Intact atrial septum.     Mitral Valve  The mitral valve is normal in structure and function. There is no evidence of  mitral valve prolapse. There is trace mitral regurgitation. There is no mitral  valve stenosis.        Tricuspid Valve  The tricuspid valve is normal in structure and function. Right ventricular  systolic pressure could not be approximated due to inadequate tricuspid  regurgitation. No tricuspid regurgitation.     Aortic Valve  There is trivial trileaflet aortic sclerosis. No aortic regurgitation is  present. No aortic stenosis is present.     Pulmonic Valve  The pulmonic valve is not well seen, but is grossly normal. There is trace  pulmonic valvular regurgitation.     Vessels  Normal size aorta. Normal size ascending aorta. The inferior vena cava is not  dilated. at 2.3 cm.     Pericardium  The pericardium appears normal. There is no pleural effusion.        Rhythm  The rhythm was sinus with wide QRS.  _____________________________________________________________________________  __  MMode/2D Measurements & Calculations     IVSd: 1.2 cm  LVIDd: 5.1 cm  LVIDs:  3.7 cm  LVPWd: 1.0 cm  FS: 26.5 %  LV mass(C)d: 220.2 grams  LV mass(C)dI: 96.1 grams/m2  Ao root diam: 3.1 cm  LA dimension: 3.6 cm  asc Aorta Diam: 3.3 cm  LA/Ao: 1.2  LVOT diam: 2.2 cm  LVOT area: 3.8 cm2  LA Volume (BP): 44.7 ml  LA Volume Index (BP): 19.5 ml/m2  RWT: 0.40           Doppler Measurements & Calculations  MV E max cam: 76.2 cm/sec  MV A max cam: 60.6 cm/sec  MV E/A: 1.3  MV dec slope: 320.7 cm/sec2  E/E' av.8  Lateral E/e': 9.9  Medial E/e': 9.8           _____________________________________________________________________________  __           Report approved by: Angelic Arredondo 2018 03:24 PM      Glucose by meter   Result Value Ref Range    Glucose 180 (H) 70 - 99 mg/dL   Glucose by meter   Result Value Ref Range    Glucose 226 (H) 70 - 99 mg/dL   Glucose by meter   Result Value Ref Range    Glucose 156 (H) 70 - 99 mg/dL   Glucose by meter   Result Value Ref Range    Glucose 182 (H) 70 - 99 mg/dL       Recent Labs  Lab 18  0355 18  1749 18  1740   WBC 18.4*  --   --  19.0*   HGB 14.4  --   --  16.0   MCV 91  --   --  91     --   --  240   INR  --   --   --  0.95     --   --  137   POTASSIUM 3.9  --   --  3.8   CHLORIDE 107  --   --  105   CO2 20  --   --  23   BUN 12  --   --  13   CR 0.93  --   --  0.97   ANIONGAP 11  --   --  9   CAMILO 8.4*  --   --  9.3   *  --   --  195*   TROPI 148.994* 113.545*  --  1.539*   TROPONIN  --   --  1.08*  --      No results found for this or any previous visit (from the past 24 hour(s)).

## 2018-05-15 NOTE — PLAN OF CARE
Problem: Patient Care Overview  Goal: Plan of Care/Patient Progress Review  Discharge Planner OT   Patient plan for discharge: home  Current status: pt ambulated approx 120 ft x 2 and completed TDM at gradual increase to .8 mph for 7 mins with fatigue and some SOB, pt's BP hypotensive at rest but improved with exercise, see vital sign flow sheet for details.   Barriers to return to prior living situation: none with wife A as needed with heavier IADL's cleaning and mowing lawn   Recommendations for discharge: home with OP CR at [facility]  Rationale for recommendations: OP CR for monitored progressive exercise and risk factor education and modification.        Entered by: Desire Gamez 05/15/2018 9:39 AM         Occupational Therapy Discharge Summary    Reason for therapy discharge:    Discharged to home with outpatient therapy.    Progress towards therapy goal(s). See goals on Care Plan in [facility] electronic health record for goal details.  Goals partially met.  Barriers to achieving goals:   discharge from facility.    Therapy recommendation(s):    Continued therapy is recommended.  Rationale/Recommendations:  home with OP CR at [facility] for monitored progressive exercise and risk factor education and modification..

## 2018-05-15 NOTE — PLAN OF CARE
Problem: Patient Care Overview  Goal: Plan of Care/Patient Progress Review  Outcome: Improving  PLAN: Discharge home 5/15 if BP stable and pain free. Cardiac rehab.    Uneventful night, pt CP free. Pt had 5.6 second pause (?), although likely artifact as pt had dropped tele box a few moments earlier. Asymptomatic.     CNS: A&O X 4; pain at bedtime low back, given norco. Slept intermittently.   CVS: HR in 80's. SBP in 90's to low 100's, asymptomatic. Given first dose lisinopril at bedtime. No peripheral edema.  RESP: RA, on 2LPM O2 overnight as CPAP not available. Per RT, pt has been refusing, was already sleeping by the time they rounded on him at 2300 so they did not set it up for him.   GI: cardiac mod carb diet.   : Voiding indep in bathroom.  SKIN: right groin site CDI.   MOBILITY: independent.   DIABETES:  at bedtime, no sliding scale insulin given. Pt continues to learn how to use glucometer and insulin pen, please reinforce teaching by allowing patient to take own sugars and give own insulin.

## 2018-05-15 NOTE — PHARMACY
Brilinta medication coverage check. $25 monthly copay.  Kelly Reyes CphT  [facility] Discharge Pharmacy Liaison  [employer]: [phone]

## 2018-05-15 NOTE — DISCHARGE INSTRUCTIONS
Cardiac Angioplasty/Stent Discharge Instructions - Femoral    After you go home:      Have an adult stay with you until tomorrow.    Drink extra fluids for 2 days.    You may resume your normal diet.    No smoking       For 24 hours - due to the sedation you received:    Relax and take it easy.    Do NOT make any important or legal decisions.    Do NOT drive or operate machines at home or at work.    Do NOT drink alcohol.    Care of Groin Puncture Site:      For the first 24 hrs - check the puncture site every 1-2 hours while awake.    For 2 days, when you cough, sneeze, laugh or move your bowels, hold your hand over the puncture site and press firmly.    Remove the bandaid after 24 hours. If there is minor oozing, apply another bandaid and remove it after 12 hours.    It is normal to have a small bruise or pea size lump at the site.    You may shower tomorrow. Do NOT take a bath, or use a hot tub or pool for at least 3 days. Do NOT scrub the site. Do not use lotion or powder near the puncture site.     Activity:            For 2 days:    No stooping or squatting    Do NOT do any heavy activity such as exercise, lifting, or straining.     No housework, yard work or any activity that make you sweat    Do NOT lift more than 10 pounds    Bleeding:      If you start bleeding from the site in your groin, lie down flat and press firmly on/above the site for 10 minutes.     Once bleeding stops, lay flat for 2 hours.     Call [facility] as soon as you can.       Call 911 right away if you have heavy bleeding or bleeding that does not stop.      Medicines:      If you are taking antiplatelet medications such as Plavix, Brilinta or Effient, do not stop taking it until you talk to your cardiologist.        If you are on Metformin (Glucophage), do not restart it until you have blood tests (within 2 to 3 days after discharge).  After you have your blood drawn, you may restart the Metformin.     Take your medications, including  blood thinners, unless your provider tells you not to.  If you take Coumadin (Warfarin), have your INR checked by your provider in  3-5 days. Call your clinic to schedule this.    If you have stopped any medicines, check with your provider about when to restart them.    Follow Up Appointments:      Follow up with [facility] Nurse Practitioner at [facility] of patient preference in 7-10 days.    Cardiac Rehab will contact you for follow up care.    Call the clinic if:      You have increased pain or a large or growing hard lump around the site.    The site is red, swollen, hot or tender.    Blood or fluid is draining from the site.    You have chills or a fever greater than 101 F (38 C).    Your leg feels numb, cool or changes color.    You have hives, a rash or unusual itching.    New pain in the back or belly that you cannot control with Tylenol.    Any questions or concerns.      Other Instructions:      If you received a stent - carry your stent card with you at all times.      [facility] [place]:    [phone] [facility] (7 days a week)

## 2018-05-15 NOTE — DISCHARGE SUMMARY
[facility]    Discharge Summary  Hospitalist    Date of Admission:  5/12/2018  Date of Discharge:  5/15/2018  Discharging Provider: Benjy Souza MD    Discharge Diagnoses   Principal Problem:    STEMI -- S/P KATHY to mid LAD 5/12/18  Active Problems:    Hyperlipidemia LDL goal <70    Leukocytosis    Smoker    JUN (obstructive sleep apnea) -- has CPAP    DM Type 2, new onset -- Hgb A1C 7.1 on 5/13/18      History of Present Illness   53 year old male with hx of hyperlipidemia and smokes who presents with left chest and arm tightness for last 2 days, presented to ER and EKG showed ST elevation in V2-V5 with max of 4 mm ST elevation in V3, underwent urgent coronary angiogram which showed:     LM had 40% distal stenosis. Other than giving rise to LAD and  circumflex artery, there is a small atrial branch arising from the  left main artery.     LAD: Type 3 [LAD supplies the entire apex].. The LAD gives rise to  septal perforators, small to medium caliber but short D1 and medium  caliber and long D2.  There is subtotal 99% occlusion of the mid LAD.  There is also 70% stenosis of the ostium of D1.     LCX gives rise to tiny OM1 and then continues as medium size OM2  vessels. The OM2 then gives of small distal branches. There is 30%  stenosis of the proximal OM2. There are mild irregularities noted in  the remaining segments of OM.     RCA gives rise to PL branches and supplies PDA. There is 10%  stenosis noted in the proximal segment of RCA and proximal PL/PDA .  There are collaterals noted from the RCA to the LAD.      Had KATHY placed in mid LAD with no residual stenosis, and no chest pain since then.     Hospital Course   Monitored in CCU post procedure.  Hgb A1C came back at 7.1, and he was instructed on how to check glucose and by discharge his glucose fasting was 150 on diet alone, and he will continue to monitor his glucose off medications and follow up with his new primary provider in 5  days.      He will stop smoking -- he is starting Lipitor for his hyperlipidemia, and he plans to start using his CPAP so he is not so tired and hopefully he will be able to exercise and lose some of the 40 lbs he put on this past year.      He was also started on Coreg 6.25 mg bid and Lisinopril 2.5 mg daily, which may be able to be increased over time if needed.  Brilinta 90 mg bid for 1 year (or Plavix 75 mg daily dependinf on insurance coverage, and aspirin EC 81 mg indefinitely.      His Echo showed:  The rhythm was sinus with wide QRS.  Left ventricular systolic function is mild to moderately reduced. The visual  ejection fraction is estimated at 40-45% and the biplane calculated LVEF =  42%.  This decrease in the LVEF is due mainly to moderate to severe anteroseptal  apical hypokinesis.  Normal left atrial size by volume at 19 ml/m2.  There is trivial trileaflet aortic sclerosis.  Right ventricular systolic pressure could not be approximated due to  inadequate tricuspid regurgitation but the inferior vena cava is not dilated  at 2.3 cm.    A follow up echo will be scheduled with cardiology in 3 months.         Benjy Souza MD  Pager: [phone]  Cell Phone:  [phone]       Significant Results and Procedures   As above    Pending Results   These results will be followed up by Dr. Souza  Unresulted Labs Ordered in the Past 30 Days of this Admission     No orders found from 3/13/2018 to 5/13/2018.          Code Status   Full Code       Primary Care Physician   Physician No Ref-Primary    Physical Exam   Temp: 98.7  F (37.1  C) Temp src: Oral BP: 101/66 (post ex OT/Cr)   Heart Rate: 95 Resp: 16 SpO2: 99 % O2 Device: None (Room air) Oxygen Delivery: 2 LPM (CPAP not available; placed on 2LPM.)  Vitals:    05/13/18 0400 05/14/18 0626 05/15/18 0540   Weight: 115.7 kg (255 lb) 106.6 kg (235 lb) 106.1 kg (234 lb)     Vital Signs with Ranges  Temp:  [98.7  F (37.1  C)-99.5  F (37.5  C)] 98.7  F (37.1   C)  Heart Rate:  [] 95  Resp:  [16] 16  BP: ()/(62-80) 101/66  SpO2:  [95 %-99 %] 99 %  I/O last 3 completed shifts:  In: 100 [P.O.:100]  Out: -     Exam on discharge:   Lungs clear  CV with reg S1S2    # Discharge Pain Plan:   - Patient currently has NO PAIN and is not being prescribed pain medications on discharge.      Discharge Disposition   Discharged to home  Condition at discharge: Good    Consultations This Hospital Stay   CARDIAC REHAB IP CONSULT  NUTRITION SERVICES ADULT IP CONSULT  PHARMACY IP CONSULT  PHARMACY IP CONSULT  CARDIOLOGY IP CONSULT  PHARMACY LIAISON FOR MEDICATION COVERAGE CONSULT  HOSPITALIST IP CONSULT  SMOKING CESSATION PROGRAM IP CONSULT    Time Spent on this Encounter   I spent a total of 35 minutes discharging this patient.     Discharge Orders     Basic metabolic panel     Lipid Profile     ALT   Last Lab Result: No results found for: ALT     CARDIAC REHAB REFERRAL     Follow-Up with Cardiologist     Follow-Up with Cardiac Advanced Practice Provider     Reason for your hospital stay   Heart attack, and new onset diabetes     Follow-up and recommended labs and tests    Follow up with primary care provider at [facility] in 3 to 6 days, review blood sugars then.     Discharge Instructions     Activity   Your activity upon discharge: avoid strenuous activity for 2 weeks.     Monitor and record   blood glucose 4 times a day, before meals and at bedtime -- bring results in with doctor appointment     Full Code     Echocardiogram   Administration of IV contrast will be tailored to this examination per the appropriate written protocol listed in the Echocardiography department Protocol Book, or by the supervising Cardiologist. This may result in an order change.    Use of contrast is at the discretion of the supervising Cardiologist.     Diet   Follow this diet upon discharge: Orders Placed This Encounter     Combination Diet Low Saturated Fat Na <2400mg Diet     Diabetic  diet (avoid concentrated sweets).       Discharge Medications   Current Discharge Medication List      START taking these medications    Details   aspirin 81 MG EC tablet Take 1 tablet (81 mg) by mouth daily  Qty: 100 tablet, Refills: 3    Associated Diagnoses: Type 2 diabetes mellitus without complication, without long-term current use of insulin (H)      atorvastatin (LIPITOR) 40 MG tablet Take 1 tablet (40 mg) by mouth daily  Qty: 30 tablet, Refills: 3    Associated Diagnoses: Hyperlipidemia LDL goal <70      carvedilol (COREG) 6.25 MG tablet Take 1 tablet (6.25 mg) by mouth 2 times daily  Qty: 60 tablet, Refills: 3    Comments: Future refills by PCP  Associated Diagnoses: Subsequent ST elevation (STEMI) myocardial infarction of anterior wall (CODE) (H)      lisinopril (PRINIVIL/ZESTRIL) 2.5 MG tablet Take 1 tablet (2.5 mg) by mouth daily  Qty: 30 tablet, Refills: 3    Associated Diagnoses: Type 2 diabetes mellitus without complication, without long-term current use of insulin (H)      ticagrelor (BRILINTA) 90 MG tablet Take 1 tablet (90 mg) by mouth every 12 hours  Qty: 60 tablet, Refills: 3    Comments: Future refills by PCP  Associated Diagnoses: Subsequent ST elevation (STEMI) myocardial infarction of anterior wall (CODE) (H)      alcohol swab prep pads Use to swab area of injection/tsering as directed.  Qty: 100 each, Refills: 3    Associated Diagnoses: Type 2 diabetes mellitus without complication, without long-term current use of insulin (H)      blood glucose calibration (NO BRAND SPECIFIED) solution Use to calibrate blood glucose monitor as needed as directed.  Qty: 1 Bottle, Refills: 3    Comments: To accompany: Glucometer per insurance.  Associated Diagnoses: Type 2 diabetes mellitus without complication, without long-term current use of insulin (H)      blood glucose monitoring (NO BRAND SPECIFIED) meter device kit Use to test blood sugar 4 times daily or as directed.  Qty: 1 kit, Refills: 0     Comments: Preferred blood glucose meter OR supplies to accompany: glucometer per insurance  Associated Diagnoses: Type 2 diabetes mellitus without complication, without long-term current use of insulin (H)      blood glucose monitoring (NO BRAND SPECIFIED) test strip Use to test blood sugar 4 times daily or as directed.  Qty: 100 strip, Refills: 6    Comments: To accompany: glucometer per insurance.  Associated Diagnoses: Type 2 diabetes mellitus without complication, without long-term current use of insulin (H)      thin (NO BRAND SPECIFIED) lancets Use with lanceting device.  Qty: 100 each, Refills: 3    Comments: To accompany: per insurance.  Associated Diagnoses: Type 2 diabetes mellitus without complication, without long-term current use of insulin (H)           Allergies   No Known Allergies  Data   Most Recent 3 CBC's:  Recent Labs   Lab Test  05/13/18 0355 05/12/18 1740   WBC  18.4*  19.0*   HGB  14.4  16.0   MCV  91  91   PLT  215  240      Most Recent 3 BMP's:  Recent Labs   Lab Test  05/13/18 0355 05/12/18   1740   NA  138  137   POTASSIUM  3.9  3.8   CHLORIDE  107  105   CO2  20  23   BUN  12  13   CR  0.93  0.97   ANIONGAP  11  9   CAMILO  8.4*  9.3   GLC  174*  195*     Most Recent 2 LFT's:No lab results found.  Most Recent INR's and Anticoagulation Dosing History:  Anticoagulation Dose History     Recent Dosing and Labs Latest Ref Rng & Units 5/12/2018    INR 0.86 - 1.14 0.95        Most Recent 3 Troponin's:  Recent Labs   Lab Test  05/13/18 0355 05/12/18 2010 05/12/18 1749 05/12/18   1740   TROPI  148.994*  113.545*   --   1.539*   TROPONIN   --    --   1.08*   --      Most Recent Cholesterol Panel:  Recent Labs   Lab Test  05/12/18 2010   CHOL  323*   LDL  231*   HDL  47   TRIG  225*     Most Recent 6 Bacteria Isolates From Any Culture (See EPIC Reports for Culture Details):No lab results found.  Most Recent TSH, T4 and A1c Labs:  Recent Labs   Lab Test  05/13/18 0355   A1C  7.1*

## 2018-05-15 NOTE — PROGRESS NOTES
"   05/15/18 0942   General Information   Patient is receptive to smoking cessation at this time Yes   Packs Per Day 1 PPD   Years Smoked (#) 30 yrs   Cigarette Pack Years 30   Stage of Behavior Change   Patient's Stage of Behavior Change Contemplation \"I might (within the next 60 days\"   Processes of Change   The following interventions were used (smoking cessation) Increase awareness of effects of smoking on health;Identify healthy alternatives;Identify support system   Motivation to Quit Scale (1-10) (did not rate)   Quit Attempt Date 05/12/18   Education/Recommendations   Education (Help for smokers handout)   Total Evaluation Time   Total Evaluation Time (Minutes) 5     "

## 2018-05-15 NOTE — DISCHARGE SUMMARY
VS stable. Tele monitor shows patient is in SR, HR of 95. Medications discussed, information reviewed, questions and concerns addressed, follow-up instructions reviewed. Pt belongings accounted for and sent home with them. Pt left the unit walking and driven by his wife.

## 2018-05-16 NOTE — PROGRESS NOTES
Patient was evaluated by cardiology while inpatient for STEMI and underwent coronary angiogram resulting in KATHY to LAD. Called patient to discuss any post hospital d/c questions he may have, review medication changes, and confirm f/u appts.Patient denied any questions regarding new medications or changes to some of his current medications that he was taking prior to admission. Patient confirmed for RN that he received his RX for Brilinta. Patient denied any SOB, chest pain, or light headedness. RN confirmed with patient that he has an apt scheduled on 5/22/18 for OP cardiac rehab and on 5/25/18 with [facility] Alejandra Narayanan (1120am lab and 1230pm with [facility]). Patient advised to call clinic with any cardiac related questions or concerns prior to his apt on 5/25/18. Patient verbalized understanding and agreed with plan.

## 2018-05-17 NOTE — TELEPHONE ENCOUNTER
MTM referral from: Transitions of Care (recent hospital discharge or ED visit)    MTM referral outreach attempt #1 on May 17, 2018 at 12:00 PM      Outcome: Patient is not interested at this time because they are not a [place] patient, will route to MTM Pharmacist/Provider as an FYI. Thank you for the referral.     Daya Mcrae MTM Coordinator

## 2018-05-21 ENCOUNTER — TELEPHONE (OUTPATIENT)
Dept: CARDIOLOGY | Facility: CLINIC | Age: 53
End: 2018-05-21

## 2018-05-21 NOTE — TELEPHONE ENCOUNTER
RN received phone call from Vidya from [facility] requesting death certificate be signed. RN reviewed with Dr. Mckinney and Dr. Mckinney ok'd signing death certificate. RN submitted documentation needed to register Dr. Mckinney with [facility] to complete death certificate. RN given approval by Dr. Mckinney (completed on form) to fill out COD information on physicians behalf. RN left VM with [facility] to inquire if profile for Dr. Mckinney could be expidited to allow ability for RN to complete COD.    RN called medical examiner's office ([phone]) and confirmed with the  that COD could be listed as natural causes as an autopsy was not completed.     RN updated Vidya at [facility] that we are waiting on authorization for [place] department of Health to allow this RN to complete COD on behalf of Dr. Mckinney

## 2020-07-06 NOTE — TELEPHONE ENCOUNTER
Received call from [facility] Medical Examiners office stating patient passed away this morning in his sleep and they were wondering if Dr. Mckinney or Dr. Phillips who saw the patient in recent hospitalization would be able to sign death certificate. Writer informed Medical Examiner that Dr. Mckinney is out of the office until next week. Requested death certificate be faxed to us and we will review with Dr. Cox nurses.     Medical records updated.   
Screening for viral disease

## 2024-03-05 NOTE — PROGRESS NOTES
"[facility]    Cardiology Progress Note 5/15/2018     Primary cardiologist will be Dr Mckinney     Assessment & Plan   Dutch Donnelly is a 53 year old male who was admitted on 5/12/2018. Patient has no significant background aside from JUN and obesity but developed chest pains secondary to an anterior STEMI due to an occlusion of the middle LAD. Patient is now s/p PCI with KATHY placement. Also found to have DM II during the stay. Lipids are significantly elevated.     #1 Anterior STEMI s/p PCI with KATHY to the middle LAD  #2 Coronary Artery Disease  - continue ASA and ticagrelor  - continue Lipitor 40 mg daily  - continue Coreg 6.25 BID and lisinopril 2.5mg  - conitinue cardiac rehabilitation  #3  Ischemic CM EF 40-45%, anterior WMA  -no HF symptoms, will hold off on diuretic therapy at this time due to low BP.   -repeat echo in 6-8 weeks  #4 \"Pause\" on tele, highly suspect artifact. No other pauses noted on tele. Pt asymptomatic and at that time his tele box had fell out of his gown.     Ok to d/c today from a cardiac standpoint. Cardiology follow up arranged and outpt cardiac rehab scheduled.     Anu Lubin PA-C      Interval History   No issues overnight.  Has walked with cardiac rehab.  No return of symptoms.  Was noted to have a >5 sec \"pause\" on tele last night, further investigation revealed he was up to the bathroom at the time and his monitor fell out of his gown.     Physical Exam   Temp: 98.7  F (37.1  C) Temp src: Oral BP: 101/66 (post ex OT/Cr)   Heart Rate: 95 Resp: 16 SpO2: 99 % O2 Device: None (Room air) Oxygen Delivery: 2 LPM (CPAP not available; placed on 2LPM.)  Vitals:    05/13/18 0400 05/14/18 0626 05/15/18 0540   Weight: 115.7 kg (255 lb) 106.6 kg (235 lb) 106.1 kg (234 lb)     Vital Signs with Ranges  Temp:  [98.7  F (37.1  C)-99.5  F (37.5  C)] 98.7  F (37.1  C)  Heart Rate:  [] 95  Resp:  [16] 16  BP: ()/(62-80) 101/66  SpO2:  [95 %-99 %] 99 %  I/O last 3 " completed shifts:  In: 100 [P.O.:100]  Out: -    General:  Well-appearing, appears as stated age, friendly, cognition intact, cooperative.  HEENT:  No major abnormalities.  Vessels:  Nonelevated JVP.  No carotid bruits.  Normal carotid upstrokes.  Heart:  Normal S1, split S2.  No murmurs appreciated.  No S3 or S4.    Lungs:  Clear to auscultation bilaterally.  No wheezes, rales, rhonchi.  Adequate air movement.  Breathing comfortably.  Abdomen:  Soft, nontender, nondistended.   Extremities:  No edema.  Normal perfusion.    Medications     Percutaneous Coronary Intervention orders placed (this is information for BPA alerting)         aspirin  81 mg Oral Daily     atorvastatin  40 mg Oral Daily     carvedilol  6.25 mg Oral BID     insulin aspart  1-10 Units Subcutaneous TID AC     insulin aspart  1-7 Units Subcutaneous At Bedtime     lisinopril  2.5 mg Oral At Bedtime     ticagrelor  90 mg Oral Q12H       Data   Results for orders placed or performed during the hospital encounter of 05/12/18 (from the past 24 hour(s))   Lactic acid level STAT for sepsis protocol   Result Value Ref Range    Lactate for Sepsis Protocol 1.3 0.4 - 1.9 mmol/L   Glucose by meter   Result Value Ref Range    Glucose 176 (H) 70 - 99 mg/dL       Recent Labs  Lab 05/13/18  0355 05/12/18 2010 05/12/18  1749 05/12/18  1740   WBC 18.4*  --   --  19.0*   HGB 14.4  --   --  16.0   MCV 91  --   --  91     --   --  240   INR  --   --   --  0.95     --   --  137   POTASSIUM 3.9  --   --  3.8   CHLORIDE 107  --   --  105   CO2 20  --   --  23   BUN 12  --   --  13   CR 0.93  --   --  0.97   ANIONGAP 11  --   --  9   CAMILO 8.4*  --   --  9.3   *  --   --  195*   TROPI 148.994* 113.545*  --  1.539*   TROPONIN  --   --  1.08*  --      No results found for this or any previous visit (from the past 24 hour(s)).   with patient

## (undated) RX ORDER — ADENOSINE 3 MG/ML
INJECTION, SOLUTION INTRAVENOUS
Status: DISPENSED
Start: 2018-01-01

## (undated) RX ORDER — BIVALIRUDIN 250 MG/5ML
INJECTION, POWDER, LYOPHILIZED, FOR SOLUTION INTRAVENOUS
Status: DISPENSED
Start: 2018-01-01

## (undated) RX ORDER — FENTANYL CITRATE 50 UG/ML
INJECTION, SOLUTION INTRAMUSCULAR; INTRAVENOUS
Status: DISPENSED
Start: 2018-01-01

## (undated) RX ORDER — FUROSEMIDE 10 MG/ML
INJECTION INTRAMUSCULAR; INTRAVENOUS
Status: DISPENSED
Start: 2018-01-01